# Patient Record
Sex: MALE | Race: WHITE | Employment: FULL TIME | URBAN - METROPOLITAN AREA
[De-identification: names, ages, dates, MRNs, and addresses within clinical notes are randomized per-mention and may not be internally consistent; named-entity substitution may affect disease eponyms.]

---

## 2017-02-24 ENCOUNTER — OFFICE VISIT (OUTPATIENT)
Dept: FAMILY MEDICINE CLINIC | Facility: CLINIC | Age: 32
End: 2017-02-24

## 2017-02-24 ENCOUNTER — HOSPITAL ENCOUNTER (OUTPATIENT)
Dept: GENERAL RADIOLOGY | Facility: HOSPITAL | Age: 32
Discharge: HOME | End: 2017-02-24
Attending: NEUROLOGICAL SURGERY
Payer: OTHER MISCELLANEOUS

## 2017-02-24 VITALS
DIASTOLIC BLOOD PRESSURE: 80 MMHG | SYSTOLIC BLOOD PRESSURE: 125 MMHG | HEART RATE: 78 BPM | BODY MASS INDEX: 34.25 KG/M2 | HEIGHT: 68 IN | WEIGHT: 226 LBS

## 2017-02-24 DIAGNOSIS — Z00.00 ANNUAL PHYSICAL EXAM: Primary | ICD-10-CM

## 2017-02-24 DIAGNOSIS — M54.42 CHRONIC LEFT-SIDED LOW BACK PAIN WITH LEFT-SIDED SCIATICA: ICD-10-CM

## 2017-02-24 DIAGNOSIS — M51.26 LUMBAR HERNIATED DISC: ICD-10-CM

## 2017-02-24 DIAGNOSIS — G89.29 CHRONIC LEFT-SIDED LOW BACK PAIN WITH LEFT-SIDED SCIATICA: ICD-10-CM

## 2017-02-24 PROCEDURE — 99395 PREV VISIT EST AGE 18-39: CPT | Performed by: FAMILY MEDICINE

## 2017-02-24 PROCEDURE — 72100 X-RAY EXAM L-S SPINE 2/3 VWS: CPT

## 2017-02-24 RX ORDER — AMOXICILLIN AND CLAVULANATE POTASSIUM 875; 125 MG/1; MG/1
TABLET, FILM COATED ORAL
Refills: 0 | COMMUNITY
Start: 2017-02-22 | End: 2017-05-03 | Stop reason: ALTCHOICE

## 2017-02-24 RX ORDER — IBUPROFEN 800 MG/1
TABLET ORAL
Refills: 0 | COMMUNITY
Start: 2016-12-29 | End: 2017-05-03 | Stop reason: ALTCHOICE

## 2017-02-24 NOTE — PROGRESS NOTES
Physical    Still with back pain still limping (witnessed)  Work comp. Off work since 2/2016   Radiating to upper posterior left leg. Patient's past medical surgical family social history was reviewed.     Review of Systems  Allergic: no environment b/l lwoer ext. Left great toe weaker than right. Assessment/Plan  1. Annual physical exam  Stable. - Lipid Panel [E]; Future  - Glucose, Serum [E]; Future    2.  Chronic left-sided low back pain with left-sided sciatica  Discussed   F/u with ortho  Con

## 2017-02-25 ENCOUNTER — APPOINTMENT (OUTPATIENT)
Dept: LAB | Age: 32
End: 2017-02-25
Attending: FAMILY MEDICINE
Payer: COMMERCIAL

## 2017-02-25 DIAGNOSIS — Z00.00 ANNUAL PHYSICAL EXAM: ICD-10-CM

## 2017-02-25 LAB
CHOLEST SERPL-MCNC: 135 MG/DL (ref 110–200)
GLUCOSE SERPL-MCNC: 84 MG/DL (ref 70–99)
HDLC SERPL-MCNC: 42 MG/DL
LDLC SERPL CALC-MCNC: 80 MG/DL (ref 0–99)
NONHDLC SERPL-MCNC: 93 MG/DL
TRIGL SERPL-MCNC: 65 MG/DL (ref 1–149)

## 2017-02-25 PROCEDURE — 36415 COLL VENOUS BLD VENIPUNCTURE: CPT

## 2017-02-25 PROCEDURE — 80061 LIPID PANEL: CPT

## 2017-02-25 PROCEDURE — 82947 ASSAY GLUCOSE BLOOD QUANT: CPT

## 2017-05-03 ENCOUNTER — OFFICE VISIT (OUTPATIENT)
Dept: FAMILY MEDICINE CLINIC | Facility: CLINIC | Age: 32
End: 2017-05-03

## 2017-05-03 VITALS
SYSTOLIC BLOOD PRESSURE: 125 MMHG | WEIGHT: 223.38 LBS | HEART RATE: 61 BPM | DIASTOLIC BLOOD PRESSURE: 85 MMHG | BODY MASS INDEX: 34 KG/M2

## 2017-05-03 DIAGNOSIS — B00.1 HERPES LABIALIS: Primary | ICD-10-CM

## 2017-05-03 PROCEDURE — 99212 OFFICE O/P EST SF 10 MIN: CPT | Performed by: FAMILY MEDICINE

## 2017-05-03 PROCEDURE — 99213 OFFICE O/P EST LOW 20 MIN: CPT | Performed by: FAMILY MEDICINE

## 2017-05-03 RX ORDER — VALACYCLOVIR HYDROCHLORIDE 1 G/1
TABLET, FILM COATED ORAL
Qty: 4 TABLET | Refills: 6 | Status: SHIPPED | OUTPATIENT
Start: 2017-05-03 | End: 2017-08-31

## 2017-06-14 ENCOUNTER — OFFICE VISIT (OUTPATIENT)
Dept: FAMILY MEDICINE CLINIC | Facility: CLINIC | Age: 32
End: 2017-06-14

## 2017-06-14 VITALS
DIASTOLIC BLOOD PRESSURE: 74 MMHG | BODY MASS INDEX: 34 KG/M2 | WEIGHT: 223 LBS | HEART RATE: 72 BPM | TEMPERATURE: 98 F | SYSTOLIC BLOOD PRESSURE: 121 MMHG

## 2017-06-14 DIAGNOSIS — J06.9 UPPER RESPIRATORY TRACT INFECTION, UNSPECIFIED TYPE: Primary | ICD-10-CM

## 2017-06-14 PROCEDURE — 99213 OFFICE O/P EST LOW 20 MIN: CPT | Performed by: FAMILY MEDICINE

## 2017-06-14 RX ORDER — AZELASTINE 1 MG/ML
2 SPRAY, METERED NASAL 2 TIMES DAILY
Qty: 1 BOTTLE | Refills: 0 | Status: SHIPPED | OUTPATIENT
Start: 2017-06-14 | End: 2017-08-31

## 2017-06-14 RX ORDER — PREDNISONE 20 MG/1
TABLET ORAL
Qty: 10 TABLET | Refills: 0 | Status: SHIPPED | OUTPATIENT
Start: 2017-06-14 | End: 2017-08-31

## 2017-08-31 ENCOUNTER — TELEPHONE (OUTPATIENT)
Dept: FAMILY MEDICINE CLINIC | Facility: CLINIC | Age: 32
End: 2017-08-31

## 2017-08-31 ENCOUNTER — OFFICE VISIT (OUTPATIENT)
Dept: FAMILY MEDICINE CLINIC | Facility: CLINIC | Age: 32
End: 2017-08-31

## 2017-08-31 VITALS
SYSTOLIC BLOOD PRESSURE: 131 MMHG | DIASTOLIC BLOOD PRESSURE: 87 MMHG | WEIGHT: 214 LBS | HEART RATE: 76 BPM | BODY MASS INDEX: 33 KG/M2

## 2017-08-31 DIAGNOSIS — G89.29 CHRONIC LOW BACK PAIN WITH LEFT-SIDED SCIATICA, UNSPECIFIED BACK PAIN LATERALITY: ICD-10-CM

## 2017-08-31 DIAGNOSIS — M54.42 CHRONIC LOW BACK PAIN WITH LEFT-SIDED SCIATICA, UNSPECIFIED BACK PAIN LATERALITY: ICD-10-CM

## 2017-08-31 DIAGNOSIS — G89.29 CHRONIC LOW BACK PAIN WITH LEFT-SIDED SCIATICA, UNSPECIFIED BACK PAIN LATERALITY: Primary | ICD-10-CM

## 2017-08-31 DIAGNOSIS — M54.42 CHRONIC LOW BACK PAIN WITH LEFT-SIDED SCIATICA, UNSPECIFIED BACK PAIN LATERALITY: Primary | ICD-10-CM

## 2017-08-31 PROCEDURE — 99212 OFFICE O/P EST SF 10 MIN: CPT | Performed by: FAMILY MEDICINE

## 2017-08-31 PROCEDURE — 99214 OFFICE O/P EST MOD 30 MIN: CPT | Performed by: FAMILY MEDICINE

## 2017-08-31 RX ORDER — DICLOFENAC SODIUM AND MISOPROSTOL 75; 200 MG/1; UG/1
1 TABLET, DELAYED RELEASE ORAL 2 TIMES DAILY
Qty: 60 TABLET | Refills: 0 | Status: SHIPPED | OUTPATIENT
Start: 2017-08-31 | End: 2017-10-02

## 2017-08-31 RX ORDER — DICLOFENAC SODIUM AND MISOPROSTOL 75; 200 MG/1; UG/1
1 TABLET, DELAYED RELEASE ORAL 2 TIMES DAILY
Qty: 60 TABLET | Refills: 0 | Status: SHIPPED | OUTPATIENT
Start: 2017-08-31 | End: 2017-08-31

## 2017-08-31 RX ORDER — IBUPROFEN 800 MG/1
800 TABLET ORAL EVERY 6 HOURS PRN
COMMUNITY
End: 2017-11-16 | Stop reason: ALTCHOICE

## 2017-08-31 RX ORDER — IBUPROFEN 200 MG
200 TABLET ORAL EVERY 6 HOURS PRN
COMMUNITY
End: 2017-10-02

## 2017-08-31 RX ORDER — ACETAMINOPHEN 500 MG
500 TABLET ORAL EVERY 6 HOURS PRN
COMMUNITY
End: 2017-10-02

## 2017-08-31 NOTE — TELEPHONE ENCOUNTER
No good substitute. Already generic. rn  Send to [address] [place] and see how much they charge. I would pay for the month and see if it works.

## 2017-08-31 NOTE — PROGRESS NOTES
Chronic back pain  advil 4-6 tabs a day (200mg)  Tylenol  4-6 tabs a day (500mg)  Otherwise my back kills me  The morning times are rough so I start taking it and then in afternoon/evening I'm dying with the back pain.   Had fusion sept 2016   Not working y

## 2017-08-31 NOTE — TELEPHONE ENCOUNTER
Pt notified, says he will give it a try.   [facility] where his wife has prescription benefits and he will compare cost.  Rx sent per pt request.

## 2017-08-31 NOTE — TELEPHONE ENCOUNTER
[facility] [address]  [place], [place]       THIS MEDICATION IS OVER $145.00      ASKING FOR A DIFFERENT MEDICATION       Current Outpatient Prescriptions:  Diclofenac-Misoprostol 75-0.2 MG Oral Tab EC Take 1 tablet by mouth 2 (two) times daily.  Disp: 60 tablet Rfl:

## 2017-09-01 ENCOUNTER — TELEPHONE (OUTPATIENT)
Dept: FAMILY MEDICINE CLINIC | Facility: CLINIC | Age: 32
End: 2017-09-01

## 2017-09-01 NOTE — TELEPHONE ENCOUNTER
Pt states he has not started taking the medication Diclofenac-misoprostol. Pt wants to know if he should still keep his appointment for the 9/21/2017. Or wait until he starts the medication.

## 2017-09-01 NOTE — TELEPHONE ENCOUNTER
Patient called back. He will cancel appt. He states he is going to wait until he gets his MRI ordered by his neurologist. He is scheduled for Thursday 9/7/17.  Patient also states his  advised him to hold off on paying for medication until his MRI

## 2017-09-01 NOTE — TELEPHONE ENCOUNTER
Patient was left a message to call back. I left message to f/u about 2-3 weeks after taking medication. Transfer to [id number]    See previous encounter; medication was expensive. Did he ever get his medication afterall at other pharmacy [facility]?). .....    LOV

## 2017-09-02 ENCOUNTER — APPOINTMENT (OUTPATIENT)
Dept: LAB | Age: 32
End: 2017-09-02
Attending: FAMILY MEDICINE
Payer: COMMERCIAL

## 2017-09-02 DIAGNOSIS — M54.42 CHRONIC LOW BACK PAIN WITH LEFT-SIDED SCIATICA, UNSPECIFIED BACK PAIN LATERALITY: ICD-10-CM

## 2017-09-02 DIAGNOSIS — G89.29 CHRONIC LOW BACK PAIN WITH LEFT-SIDED SCIATICA, UNSPECIFIED BACK PAIN LATERALITY: ICD-10-CM

## 2017-09-02 LAB
ALBUMIN SERPL BCP-MCNC: 4.2 G/DL (ref 3.5–4.8)
ALBUMIN/GLOB SERPL: 2.3 {RATIO} (ref 1–2)
ALP SERPL-CCNC: 37 U/L (ref 32–100)
ALT SERPL-CCNC: 20 U/L (ref 17–63)
ANION GAP SERPL CALC-SCNC: 7 MMOL/L (ref 0–18)
AST SERPL-CCNC: 20 U/L (ref 15–41)
BILIRUB SERPL-MCNC: 1 MG/DL (ref 0.3–1.2)
BUN SERPL-MCNC: 9 MG/DL (ref 8–20)
BUN/CREAT SERPL: 8.5 (ref 10–20)
CALCIUM SERPL-MCNC: 8.6 MG/DL (ref 8.5–10.5)
CHLORIDE SERPL-SCNC: 106 MMOL/L (ref 95–110)
CO2 SERPL-SCNC: 28 MMOL/L (ref 22–32)
CREAT SERPL-MCNC: 1.06 MG/DL (ref 0.5–1.5)
GLOBULIN PLAS-MCNC: 1.8 G/DL (ref 2.5–3.7)
GLUCOSE SERPL-MCNC: 76 MG/DL (ref 70–99)
OSMOLALITY UR CALC.SUM OF ELEC: 289 MOSM/KG (ref 275–295)
POTASSIUM SERPL-SCNC: 4.3 MMOL/L (ref 3.3–5.1)
PROT SERPL-MCNC: 6 G/DL (ref 5.9–8.4)
SODIUM SERPL-SCNC: 141 MMOL/L (ref 136–144)

## 2017-09-02 PROCEDURE — 80053 COMPREHEN METABOLIC PANEL: CPT

## 2017-09-02 PROCEDURE — 36415 COLL VENOUS BLD VENIPUNCTURE: CPT

## 2017-09-03 ENCOUNTER — PATIENT MESSAGE (OUTPATIENT)
Dept: FAMILY MEDICINE CLINIC | Facility: CLINIC | Age: 32
End: 2017-09-03

## 2017-09-03 DIAGNOSIS — R77.1 ABNORMALITY OF GLOBULIN: Primary | ICD-10-CM

## 2017-09-05 ENCOUNTER — PATIENT MESSAGE (OUTPATIENT)
Dept: FAMILY MEDICINE CLINIC | Facility: CLINIC | Age: 32
End: 2017-09-05

## 2017-09-07 ENCOUNTER — HOSPITAL ENCOUNTER (OUTPATIENT)
Dept: MRI IMAGING | Age: 32
Discharge: HOME | End: 2017-09-07
Attending: NEUROLOGICAL SURGERY
Payer: OTHER MISCELLANEOUS

## 2017-09-07 DIAGNOSIS — M51.37 DISC DEGENERATION, LUMBOSACRAL: ICD-10-CM

## 2017-09-07 DIAGNOSIS — M51.16 HERNIATION OF LUMBAR INTERVERTEBRAL DISC WITH RADICULOPATHY: ICD-10-CM

## 2017-09-07 PROCEDURE — A9575 INJ GADOTERATE MEGLUMI 0.1ML: HCPCS | Performed by: NEUROLOGICAL SURGERY

## 2017-09-07 PROCEDURE — 72158 MRI LUMBAR SPINE W/O & W/DYE: CPT | Performed by: NEUROLOGICAL SURGERY

## 2017-09-08 NOTE — TELEPHONE ENCOUNTER
[facility] msg sent to patient.   If he has further questions, transfer to [phone]

## 2017-09-08 NOTE — TELEPHONE ENCOUNTER
From: Marco Gupta  To: Dahlia Tai DO  Sent: 9/5/2017 8:19 AM CDT  Subject: Test Results Question    Not sure if you got my last message, but I seen the test results LOL and I have no idea what they mean.  So if you could just let me know that

## 2017-09-08 NOTE — TELEPHONE ENCOUNTER
From: Akiko Watson  To: Sheila Fernandez DO  Sent: 9/3/2017 5:46 PM CDT  Subject: Test Results Question    I have a question about COMP METABOLIC PANEL (14) resulted on 9/2/17, 11:42 AM.    So since I have no idea what all this means lol.  Is everyt

## 2017-09-08 NOTE — TELEPHONE ENCOUNTER
Advised patient of Dr. Susanna Snider s note and went over any questions patient had. Patient verbalized understanding. Lab order generated. Appointment made for 10/2/17 at 9:20am with Dr Marely Winter in [place].  Patient will get non-fasting blood work done a

## 2017-09-12 ENCOUNTER — TELEPHONE (OUTPATIENT)
Dept: OTHER | Age: 32
End: 2017-09-12

## 2017-09-15 ENCOUNTER — PATIENT MESSAGE (OUTPATIENT)
Dept: FAMILY MEDICINE CLINIC | Facility: CLINIC | Age: 32
End: 2017-09-15

## 2017-09-16 ENCOUNTER — PATIENT MESSAGE (OUTPATIENT)
Dept: FAMILY MEDICINE CLINIC | Facility: CLINIC | Age: 32
End: 2017-09-16

## 2017-09-16 NOTE — TELEPHONE ENCOUNTER
From: Pablo Mtz  To: Dewayne Crockett DO  Sent: 9/15/2017 3:33 PM CDT  Subject: Test Results Question    I was just wondering what The results meant?  In my terms LOL didn't understand some of the big words PASCUAL thank you

## 2017-09-18 ENCOUNTER — LAB ENCOUNTER (OUTPATIENT)
Dept: LAB | Age: 32
End: 2017-09-18
Attending: FAMILY MEDICINE
Payer: COMMERCIAL

## 2017-09-18 DIAGNOSIS — R77.1 ABNORMALITY OF GLOBULIN: ICD-10-CM

## 2017-09-18 LAB
ALBUMIN SERPL BCP-MCNC: 4.2 G/DL (ref 3.5–4.8)
ALBUMIN/GLOB SERPL: 2.1 {RATIO} (ref 1–2)
ALP SERPL-CCNC: 43 U/L (ref 32–100)
ALT SERPL-CCNC: 17 U/L (ref 17–63)
ANION GAP SERPL CALC-SCNC: 10 MMOL/L (ref 0–18)
AST SERPL-CCNC: 21 U/L (ref 15–41)
BASOPHILS # BLD: 0.1 K/UL (ref 0–0.2)
BASOPHILS NFR BLD: 1 %
BILIRUB SERPL-MCNC: 0.8 MG/DL (ref 0.3–1.2)
BUN SERPL-MCNC: 12 MG/DL (ref 8–20)
BUN/CREAT SERPL: 12.9 (ref 10–20)
CALCIUM SERPL-MCNC: 8.5 MG/DL (ref 8.5–10.5)
CHLORIDE SERPL-SCNC: 103 MMOL/L (ref 95–110)
CO2 SERPL-SCNC: 24 MMOL/L (ref 22–32)
CREAT SERPL-MCNC: 0.93 MG/DL (ref 0.5–1.5)
EOSINOPHIL # BLD: 0.5 K/UL (ref 0–0.7)
EOSINOPHIL NFR BLD: 6 %
ERYTHROCYTE [DISTWIDTH] IN BLOOD BY AUTOMATED COUNT: 12.6 % (ref 11–15)
GLOBULIN PLAS-MCNC: 2 G/DL (ref 2.5–3.7)
GLUCOSE SERPL-MCNC: 76 MG/DL (ref 70–99)
HCT VFR BLD AUTO: 43.1 % (ref 41–52)
HGB BLD-MCNC: 14.7 G/DL (ref 13.5–17.5)
LYMPHOCYTES # BLD: 2.6 K/UL (ref 1–4)
LYMPHOCYTES NFR BLD: 31 %
MCH RBC QN AUTO: 32.1 PG (ref 27–32)
MCHC RBC AUTO-ENTMCNC: 34.2 G/DL (ref 32–37)
MCV RBC AUTO: 94 FL (ref 80–100)
MONOCYTES # BLD: 0.4 K/UL (ref 0–1)
MONOCYTES NFR BLD: 4 %
NEUTROPHILS # BLD AUTO: 4.9 K/UL (ref 1.8–7.7)
NEUTROPHILS NFR BLD: 58 %
OSMOLALITY UR CALC.SUM OF ELEC: 283 MOSM/KG (ref 275–295)
PLATELET # BLD AUTO: 281 K/UL (ref 140–400)
PMV BLD AUTO: 8.7 FL (ref 7.4–10.3)
POTASSIUM SERPL-SCNC: 3.7 MMOL/L (ref 3.3–5.1)
PROT SERPL-MCNC: 6.2 G/DL (ref 5.9–8.4)
RBC # BLD AUTO: 4.59 M/UL (ref 4.5–5.9)
SODIUM SERPL-SCNC: 137 MMOL/L (ref 136–144)
WBC # BLD AUTO: 8.4 K/UL (ref 4–11)

## 2017-09-18 PROCEDURE — 36415 COLL VENOUS BLD VENIPUNCTURE: CPT

## 2017-09-18 PROCEDURE — 80053 COMPREHEN METABOLIC PANEL: CPT

## 2017-09-18 PROCEDURE — 85025 COMPLETE CBC W/AUTO DIFF WBC: CPT

## 2017-09-18 NOTE — TELEPHONE ENCOUNTER
From: Genny Sierra  To: Beny Ledbetter DO  Sent: 9/16/2017 11:36 AM CDT  Subject: Test Results Question    The MRI results

## 2017-09-19 ENCOUNTER — TELEPHONE (OUTPATIENT)
Dept: OTHER | Age: 32
End: 2017-09-19

## 2017-09-19 DIAGNOSIS — E88.09 PROTEINS SERUM PLASMA LOW: Primary | ICD-10-CM

## 2017-09-19 NOTE — TELEPHONE ENCOUNTER
[facility] pt was inform of your message below. 1. I called lab and they can not run a Serum Protein Electrophoresis. Pt will have to come in for another blood drawn. Do you want him to have it done? If yes a need a dx low globulin is not a option.

## 2017-09-19 NOTE — TELEPHONE ENCOUNTER
I have a question about COMP METABOLIC PANEL (14) resulted on 9/18/17, 5:48 PM.    So is everything ok?  With my blood test results done 9-18-17  Thanks   Carlito Rodrigues

## 2017-09-19 NOTE — TELEPHONE ENCOUNTER
Notes Recorded by Trinh Peñaloza DO on 9/19/2017 at 9:21 AM CDT  Protein level was low   rn see if they can run a serum protein electrophoresis  Dx low globulin (a protein you body makes)  Other than that the cmp and cbc were normal.

## 2017-09-21 ENCOUNTER — APPOINTMENT (OUTPATIENT)
Dept: LAB | Age: 32
End: 2017-09-21
Attending: FAMILY MEDICINE
Payer: COMMERCIAL

## 2017-09-21 DIAGNOSIS — E88.09 PROTEINS SERUM PLASMA LOW: ICD-10-CM

## 2017-09-21 PROCEDURE — 36415 COLL VENOUS BLD VENIPUNCTURE: CPT

## 2017-09-21 PROCEDURE — 84165 PROTEIN E-PHORESIS SERUM: CPT

## 2017-09-26 ENCOUNTER — PATIENT MESSAGE (OUTPATIENT)
Dept: FAMILY MEDICINE CLINIC | Facility: CLINIC | Age: 32
End: 2017-09-26

## 2017-09-26 LAB
ALBUMIN SERPL ELPH-MCNC: 4.49 G/DL (ref 3.75–5.21)
ALBUMIN/GLOB SERPL: 2.13 {RATIO} (ref 1–2)
ALPHA1 GLOB SERPL ELPH-MCNC: 0.28 G/DL (ref 0.19–0.46)
ALPHA2 GLOB SERPL ELPH-MCNC: 0.69 G/DL (ref 0.48–1.05)
B-GLOBULIN SERPL ELPH-MCNC: 0.65 G/DL (ref 0.68–1.23)
GAMMA GLOB SERPL ELPH-MCNC: 0.48 G/DL (ref 0.62–1.7)
TOTAL PROTEIN (SPECIAL TESTING): 6.6 G/DL (ref 6.5–9.1)

## 2017-09-27 ENCOUNTER — TELEPHONE (OUTPATIENT)
Dept: OTHER | Age: 32
End: 2017-09-27

## 2017-09-27 NOTE — TELEPHONE ENCOUNTER
Pt advised of results/message below, he verbalized understanding. He states he has completely eliminated tylenol since last month and currently he has been taking ibuprofen 200 mg, usually twice daily, but some times takes as many as 4 day.   Pt lives with

## 2017-09-28 ENCOUNTER — TELEPHONE (OUTPATIENT)
Dept: FAMILY MEDICINE CLINIC | Facility: CLINIC | Age: 32
End: 2017-09-28

## 2017-09-28 DIAGNOSIS — M54.5 CHRONIC LOW BACK PAIN, UNSPECIFIED BACK PAIN LATERALITY, WITH SCIATICA PRESENCE UNSPECIFIED: Primary | ICD-10-CM

## 2017-09-28 DIAGNOSIS — G89.29 CHRONIC LOW BACK PAIN, UNSPECIFIED BACK PAIN LATERALITY, WITH SCIATICA PRESENCE UNSPECIFIED: Primary | ICD-10-CM

## 2017-09-28 NOTE — TELEPHONE ENCOUNTER
Spoke to the patient and states that he is under [employer] right now, he needs to talk to his  first before going to the pain clinic and follow the referral. Patient states that he does'nt have much choice but will take the ibuprofen 2-4 x/day on

## 2017-10-02 ENCOUNTER — OFFICE VISIT (OUTPATIENT)
Dept: FAMILY MEDICINE CLINIC | Facility: CLINIC | Age: 32
End: 2017-10-02

## 2017-10-02 VITALS
HEART RATE: 60 BPM | WEIGHT: 222 LBS | TEMPERATURE: 98 F | HEIGHT: 68 IN | RESPIRATION RATE: 16 BRPM | BODY MASS INDEX: 33.65 KG/M2 | DIASTOLIC BLOOD PRESSURE: 66 MMHG | SYSTOLIC BLOOD PRESSURE: 107 MMHG

## 2017-10-02 DIAGNOSIS — G89.29 CHRONIC LEFT-SIDED LOW BACK PAIN WITH LEFT-SIDED SCIATICA: Primary | ICD-10-CM

## 2017-10-02 DIAGNOSIS — M54.42 CHRONIC LEFT-SIDED LOW BACK PAIN WITH LEFT-SIDED SCIATICA: Primary | ICD-10-CM

## 2017-10-02 PROCEDURE — 99213 OFFICE O/P EST LOW 20 MIN: CPT | Performed by: FAMILY MEDICINE

## 2017-10-02 PROCEDURE — 99212 OFFICE O/P EST SF 10 MIN: CPT | Performed by: FAMILY MEDICINE

## 2017-10-02 RX ORDER — VALACYCLOVIR HYDROCHLORIDE 1 G/1
TABLET, FILM COATED ORAL
Refills: 6 | COMMUNITY
Start: 2017-09-18 | End: 2017-11-16 | Stop reason: ALTCHOICE

## 2017-10-02 NOTE — PROGRESS NOTES
advil tylenol  2-3 a day  Its not a lot anymore. \"I tried to go a whole day without back pain medication. I need to take something. \"  Has been limiting his tylenol use. left lower back pain with throbbing down the left leg.     Examination of the back

## 2017-10-12 ENCOUNTER — OFFICE VISIT (OUTPATIENT)
Dept: FAMILY MEDICINE CLINIC | Facility: CLINIC | Age: 32
End: 2017-10-12

## 2017-10-12 VITALS
WEIGHT: 218.5 LBS | HEART RATE: 78 BPM | BODY MASS INDEX: 33.12 KG/M2 | HEIGHT: 68 IN | DIASTOLIC BLOOD PRESSURE: 85 MMHG | SYSTOLIC BLOOD PRESSURE: 134 MMHG

## 2017-10-12 DIAGNOSIS — M54.42 CHRONIC LEFT-SIDED LOW BACK PAIN WITH LEFT-SIDED SCIATICA: Primary | ICD-10-CM

## 2017-10-12 DIAGNOSIS — G89.29 CHRONIC LEFT-SIDED LOW BACK PAIN WITH LEFT-SIDED SCIATICA: Primary | ICD-10-CM

## 2017-10-12 DIAGNOSIS — T39.395A NSAID INDUCED GASTRITIS: ICD-10-CM

## 2017-10-12 DIAGNOSIS — K29.60 NSAID INDUCED GASTRITIS: ICD-10-CM

## 2017-10-12 PROCEDURE — 99214 OFFICE O/P EST MOD 30 MIN: CPT | Performed by: FAMILY MEDICINE

## 2017-10-12 PROCEDURE — 99212 OFFICE O/P EST SF 10 MIN: CPT | Performed by: FAMILY MEDICINE

## 2017-10-12 PROCEDURE — 90686 IIV4 VACC NO PRSV 0.5 ML IM: CPT | Performed by: FAMILY MEDICINE

## 2017-10-12 PROCEDURE — 90471 IMMUNIZATION ADMIN: CPT | Performed by: FAMILY MEDICINE

## 2017-10-12 RX ORDER — TRAMADOL HYDROCHLORIDE 50 MG/1
50 TABLET ORAL EVERY 8 HOURS PRN
Qty: 60 TABLET | Refills: 0 | Status: SHIPPED | OUTPATIENT
Start: 2017-10-12 | End: 2018-07-09 | Stop reason: ALTCHOICE

## 2017-10-12 RX ORDER — OMEPRAZOLE 40 MG/1
40 CAPSULE, DELAYED RELEASE ORAL DAILY
Qty: 30 CAPSULE | Refills: 1 | Status: SHIPPED | OUTPATIENT
Start: 2017-10-12 | End: 2017-11-09

## 2017-10-12 RX ORDER — CINNAMON BARK
POWDER (GRAM) MISCELLANEOUS
COMMUNITY
End: 2017-11-16 | Stop reason: ALTCHOICE

## 2017-10-12 NOTE — PROGRESS NOTES
Ibuprofen is down to 2-3 a day  \"I was in pretty bad. \"  Hasn't gone to pain clinc yet. Reviewed mri with patient     Doing some home exercises. Having stomach bloating  Some days it wasn't bad. Eating less.   \"I ate 1/2 apple and it feels like I a

## 2017-11-09 ENCOUNTER — OFFICE VISIT (OUTPATIENT)
Dept: FAMILY MEDICINE CLINIC | Facility: CLINIC | Age: 32
End: 2017-11-09

## 2017-11-09 VITALS
BODY MASS INDEX: 32 KG/M2 | SYSTOLIC BLOOD PRESSURE: 121 MMHG | HEART RATE: 69 BPM | DIASTOLIC BLOOD PRESSURE: 81 MMHG | WEIGHT: 210 LBS

## 2017-11-09 DIAGNOSIS — Z00.00 ANNUAL PHYSICAL EXAM: Primary | ICD-10-CM

## 2017-11-09 DIAGNOSIS — K21.9 GASTROESOPHAGEAL REFLUX DISEASE, ESOPHAGITIS PRESENCE NOT SPECIFIED: ICD-10-CM

## 2017-11-09 DIAGNOSIS — G89.29 CHRONIC LEFT-SIDED LOW BACK PAIN WITH LEFT-SIDED SCIATICA: ICD-10-CM

## 2017-11-09 DIAGNOSIS — M54.42 CHRONIC LEFT-SIDED LOW BACK PAIN WITH LEFT-SIDED SCIATICA: ICD-10-CM

## 2017-11-09 DIAGNOSIS — E66.09 CLASS 1 OBESITY DUE TO EXCESS CALORIES WITHOUT SERIOUS COMORBIDITY WITH BODY MASS INDEX (BMI) OF 32.0 TO 32.9 IN ADULT: ICD-10-CM

## 2017-11-09 PROCEDURE — 99395 PREV VISIT EST AGE 18-39: CPT | Performed by: FAMILY MEDICINE

## 2017-11-09 RX ORDER — OMEPRAZOLE 40 MG/1
40 CAPSULE, DELAYED RELEASE ORAL DAILY
Qty: 90 CAPSULE | Refills: 1 | Status: SHIPPED | OUTPATIENT
Start: 2017-11-09 | End: 2018-11-04

## 2017-11-09 NOTE — PROGRESS NOTES
Physical    Needs to take ppi daily  \"When I don't take it I can feel my stomach.'    \"My back bothers me still but I'm doing a lot more. \"    Has lost 13 lbs in last few months  \"I'm trying too.   \"    Patient's past medical surgical family social hist normal.    Assessment/Plan    1. Annual physical exam  Stable. 2. Gastroesophageal reflux disease, esophagitis presence not specified  Continue ppi    3. Chronic left-sided low back pain with left-sided sciatica  Still working through it. Tramadol prn.

## 2017-11-12 ENCOUNTER — PATIENT MESSAGE (OUTPATIENT)
Dept: FAMILY MEDICINE CLINIC | Facility: CLINIC | Age: 32
End: 2017-11-12

## 2017-11-13 ENCOUNTER — NURSE TRIAGE (OUTPATIENT)
Dept: OTHER | Age: 32
End: 2017-11-13

## 2017-11-13 DIAGNOSIS — R10.9 ABDOMINAL PAIN, UNSPECIFIED ABDOMINAL LOCATION: Primary | ICD-10-CM

## 2017-11-13 RX ORDER — DICYCLOMINE HCL 20 MG
TABLET ORAL
Qty: 30 TABLET | Refills: 0 | Status: SHIPPED | OUTPATIENT
Start: 2017-11-13 | End: 2018-07-09 | Stop reason: ALTCHOICE

## 2017-11-13 NOTE — TELEPHONE ENCOUNTER
Continue with omeprazole  Add bentyl 20mg 1 po tid #30 no refill  Refer to gi dx abd pain  To er if fever or vomiting.

## 2017-11-13 NOTE — TELEPHONE ENCOUNTER
From: Meagan Ferrer  To: Maryann Kellogg DO  Sent: 11/12/2017 7:19 AM CST  Subject: Other    Hello this is Peconic Stains and I know I was in to see you on Thursday.  But on Friday night my stomach was in a lot of pain kind a like tremendously bad

## 2017-11-13 NOTE — TELEPHONE ENCOUNTER
Advised patient on Dr. Shannan Montalvo prescriptions, GI referral, and recommendations. Script for bentyl generic sent to pharmacy. Patient advised to consult [facility] website for GI specialist in the network; patient agreed and will schedule.  Advised patient if he

## 2017-11-13 NOTE — TELEPHONE ENCOUNTER
Action Requested: Summary for Provider     []  Critical Lab, Recommendations Needed  [x] Need Additional Advice  []   FYI    [x]   Need Orders  [] Need Medications Sent to Pharmacy  []  Other     SUMMARY:Requesting MD recommendations or tests to be done, d ABDOMINAL PAIN - MALE-A-OH

## 2017-11-14 ENCOUNTER — TELEPHONE (OUTPATIENT)
Dept: OTHER | Age: 32
End: 2017-11-14

## 2017-11-14 ENCOUNTER — TELEPHONE (OUTPATIENT)
Dept: GASTROENTEROLOGY | Facility: CLINIC | Age: 32
End: 2017-11-14

## 2017-11-14 ENCOUNTER — PATIENT MESSAGE (OUTPATIENT)
Dept: FAMILY MEDICINE CLINIC | Facility: CLINIC | Age: 32
End: 2017-11-14

## 2017-11-14 DIAGNOSIS — R14.0 ABDOMINAL BLOATING: Primary | ICD-10-CM

## 2017-11-14 NOTE — TELEPHONE ENCOUNTER
Yesenia from Dr Crane Narciso office called (ext [phone]). Pt has been having a lot of bloating, distention, diarrhea for a couple of months, alternates with constipation. Takes omeprazole and dicyclomine, prune juice, occasional laxative.  Complains also of

## 2017-11-14 NOTE — TELEPHONE ENCOUNTER
Patient stated he has been taking all the medication prescribed and still has continuous abdominal pains everyday after eating. He stated he cannot wait until his appointment to see Dr. Itzel Mariano on 11/30/17.    Every time he eats he develops an uncomforta

## 2017-11-15 NOTE — TELEPHONE ENCOUNTER
Patient notified of MD's recommendation. Patient verbalized understanding. Patient will call to schedule testing.

## 2017-11-15 NOTE — PROGRESS NOTES
[address] [facility] [address] Gastroenterology                                                                                                  Clinic History and Physical     Pa evidence of cholelithiasis or cholecystitis. No evidence of hepatobiliary dilatation. Pancreas unremarkable though views of the tail are obscured by bowel gas.     Most recent lab work including a [address] and CMP were completed in September 2017 and were most Diabetes Paternal Aunt    • Crohn's Disease Paternal Aunt    • Diabetes Maternal Aunt       Social History: Smoking status: Former Smoker                                                              Packs/day: 1.50      Years: 0.00         Quit date: 5/1/2 warm and well perfused   Lung: effort normal and breath sounds normal, no respiratory distress, wheezes or rales  GI: soft, non-tender exam in all quadrants without rigidity or guarding, non-distended, no abnormal bowel sounds noted, no masses are palpated patient is advised to follow up with the office with new onset or worsening symptoms. 2.  GERD: Well managed on omeprazole 40 mg daily without overt acid reflux symptoms breaking through.   Patient however does continue to have the above listed symptoms Visit:    Orders Placed This Encounter      Sed Jimmy Rendon (Automated)      Immunoglobulin A, Qn, Serum (IGA)      Tissue Transglutaminase Ab, IgA      C-Reactive Protein      Calprotectin, Fecal    Meds This Visit:    Signed Prescriptions Disp Refil

## 2017-11-15 NOTE — TELEPHONE ENCOUNTER
Patient indicated that called scheduling and could not get an appointment till 12/2017. Patient only ate about 5 crackers and prune juice yesterday. Today had his medications with 5 crackers and fruit juice.   Scheduling indicated that patient has to be NPO

## 2017-11-16 ENCOUNTER — HOSPITAL ENCOUNTER (OUTPATIENT)
Dept: ULTRASOUND IMAGING | Facility: HOSPITAL | Age: 32
Discharge: HOME | End: 2017-11-16
Attending: FAMILY MEDICINE
Payer: COMMERCIAL

## 2017-11-16 ENCOUNTER — APPOINTMENT (OUTPATIENT)
Dept: LAB | Facility: HOSPITAL | Age: 32
End: 2017-11-16
Attending: FAMILY MEDICINE
Payer: COMMERCIAL

## 2017-11-16 ENCOUNTER — TELEPHONE (OUTPATIENT)
Dept: GASTROENTEROLOGY | Facility: CLINIC | Age: 32
End: 2017-11-16

## 2017-11-16 ENCOUNTER — OFFICE VISIT (OUTPATIENT)
Dept: GASTROENTEROLOGY | Facility: CLINIC | Age: 32
End: 2017-11-16

## 2017-11-16 VITALS
BODY MASS INDEX: 31.01 KG/M2 | WEIGHT: 204.63 LBS | DIASTOLIC BLOOD PRESSURE: 86 MMHG | SYSTOLIC BLOOD PRESSURE: 128 MMHG | HEIGHT: 68 IN | HEART RATE: 65 BPM

## 2017-11-16 DIAGNOSIS — Z83.79 FHX: CROHN'S DISEASE: Primary | ICD-10-CM

## 2017-11-16 DIAGNOSIS — R68.81 EARLY SATIETY: ICD-10-CM

## 2017-11-16 DIAGNOSIS — R63.0 LOSS OF APPETITE: ICD-10-CM

## 2017-11-16 DIAGNOSIS — R14.0 ABDOMINAL BLOATING: ICD-10-CM

## 2017-11-16 DIAGNOSIS — Z83.79 FAMILY HISTORY OF CROHN'S DISEASE: ICD-10-CM

## 2017-11-16 DIAGNOSIS — R11.11 VOMITING WITHOUT NAUSEA, INTRACTABILITY OF VOMITING NOT SPECIFIED, UNSPECIFIED VOMITING TYPE: ICD-10-CM

## 2017-11-16 DIAGNOSIS — R19.4 ALTERED BOWEL HABITS: ICD-10-CM

## 2017-11-16 DIAGNOSIS — R10.9 ABDOMINAL CRAMPING: ICD-10-CM

## 2017-11-16 DIAGNOSIS — R63.4 WEIGHT LOSS, UNINTENTIONAL: ICD-10-CM

## 2017-11-16 DIAGNOSIS — R63.4 WEIGHT LOSS: ICD-10-CM

## 2017-11-16 DIAGNOSIS — R10.9 ABDOMINAL CRAMPING: Primary | ICD-10-CM

## 2017-11-16 DIAGNOSIS — R10.9 ABDOMINAL PAIN, UNSPECIFIED ABDOMINAL LOCATION: ICD-10-CM

## 2017-11-16 DIAGNOSIS — R19.4 CHANGE IN BOWEL HABITS: ICD-10-CM

## 2017-11-16 DIAGNOSIS — D80.2 IGA DEFICIENCY (HCC): ICD-10-CM

## 2017-11-16 PROCEDURE — 83516 IMMUNOASSAY NONANTIBODY: CPT

## 2017-11-16 PROCEDURE — 99244 OFF/OP CNSLTJ NEW/EST MOD 40: CPT | Performed by: NURSE PRACTITIONER

## 2017-11-16 PROCEDURE — 86140 C-REACTIVE PROTEIN: CPT

## 2017-11-16 PROCEDURE — 36415 COLL VENOUS BLD VENIPUNCTURE: CPT

## 2017-11-16 PROCEDURE — 76705 ECHO EXAM OF ABDOMEN: CPT | Performed by: FAMILY MEDICINE

## 2017-11-16 PROCEDURE — 85652 RBC SED RATE AUTOMATED: CPT

## 2017-11-16 PROCEDURE — 82784 ASSAY IGA/IGD/IGG/IGM EACH: CPT | Performed by: NURSE PRACTITIONER

## 2017-11-16 PROCEDURE — 99212 OFFICE O/P EST SF 10 MIN: CPT | Performed by: NURSE PRACTITIONER

## 2017-11-16 NOTE — H&P
[address] [facility] [address] Gastroenterology                                                                                                  Clinic History and Physical     Pa evidence of cholelithiasis or cholecystitis. No evidence of hepatobiliary dilatation. Pancreas unremarkable though views of the tail are obscured by bowel gas.     Most recent lab work including a [address] and CMP were completed in September 2017 and were most Diabetes Paternal Aunt    • Crohn's Disease Paternal Aunt    • Diabetes Maternal Aunt       Social History: Smoking status: Former Smoker                                                              Packs/day: 1.50      Years: 0.00         Quit date: 5/1/2 warm and well perfused   Lung: effort normal and breath sounds normal, no respiratory distress, wheezes or rales  GI: soft, non-tender exam in all quadrants without rigidity or guarding, non-distended, no abnormal bowel sounds noted, no masses are palpated hernia, IBD, colon polyps, or malignancy. MAC sedation recommended due to patient's preference for sedation. The patient is advised to follow up with the office with new onset or worsening symptoms.      2.  GERD: Well managed on omeprazole 40 mg daily wi endoscopy/enteroscopy with intervention [i.e. polypectomy, ablation, stent placement, etc.] as indicated.         Orders This Visit:    Orders Placed This Encounter      Sed Jimmy Rendon (Automated)      Immunoglobulin A, Qn, Serum (IGA)      Tissue Tra

## 2017-11-16 NOTE — TELEPHONE ENCOUNTER
Scheduled for:  Colonoscopy 65867/EGD [address]  Provider Name: Sandra Will  Date:  12/13/17  Location:  [facility]  Sedation:  MAC  Time:  1:00 pm  Prep: split dose suprep  Meds/Allergies Reconciled?: sulfa  Diagnosis with codes:  Abdominal pain R10.9, altered bowel habit

## 2017-11-16 NOTE — PATIENT INSTRUCTIONS
1. Schedule colonoscopy/EGD with Dr. Adithya Nava w/ MAC    2.  bowel prep from pharmacy - split dose Colyte     3. Continue all medications for procedure     4. Read all bowel prep instructions carefully    5.  AVOID seeds, nuts, popcorn, raw fruits and ve

## 2017-11-17 ENCOUNTER — APPOINTMENT (OUTPATIENT)
Dept: LAB | Facility: HOSPITAL | Age: 32
End: 2017-11-17
Attending: NURSE PRACTITIONER
Payer: COMMERCIAL

## 2017-11-17 DIAGNOSIS — Z83.79 FAMILY HISTORY OF CROHN'S DISEASE: ICD-10-CM

## 2017-11-17 DIAGNOSIS — R19.4 ALTERED BOWEL HABITS: ICD-10-CM

## 2017-11-17 DIAGNOSIS — R10.9 ABDOMINAL CRAMPING: ICD-10-CM

## 2017-11-17 PROCEDURE — 83993 ASSAY FOR CALPROTECTIN FECAL: CPT

## 2017-11-20 ENCOUNTER — PATIENT MESSAGE (OUTPATIENT)
Dept: GASTROENTEROLOGY | Facility: CLINIC | Age: 32
End: 2017-11-20

## 2017-11-20 NOTE — TELEPHONE ENCOUNTER
Ultrasound was completed 11/16/17 - was seen by GI 11/16/17    Jessica Hoyt DO   Family Practice      []Manual[]Template  []Copied  Saw apn and plan is in place.       Electronically signed by Jessica Hoyt DO at 11/16/2017  4:17 PM

## 2017-11-20 NOTE — TELEPHONE ENCOUNTER
From: Gail File  To: MIKAL De Jesus  Sent: 11/20/2017 9:13 AM CST  Subject: Test Results Question    I have a question about TISSUE TRANSGLUTAMINASE AB IGA resulted on 11/17/17, 11:06 AM.    What does this mean?

## 2017-11-20 NOTE — TELEPHONE ENCOUNTER
Routed to [facility].   Looks like IGG still pending to advise further on these results

## 2017-11-21 ENCOUNTER — PATIENT MESSAGE (OUTPATIENT)
Dept: GASTROENTEROLOGY | Facility: CLINIC | Age: 32
End: 2017-11-21

## 2017-11-21 NOTE — TELEPHONE ENCOUNTER
From: Azucena Haro  To: MIKAL Bishop  Sent: 11/21/2017 12:57 PM CST  Subject: Other    OK thank you for getting back to me so soon.  I'm just a little stressed out over this, but basically what you're saying is it probably looks like inflammato

## 2017-11-21 NOTE — TELEPHONE ENCOUNTER
From: Rogenia Hashimoto  To: MIKAL Pollard  Sent: 11/21/2017 3:17 PM CST  Subject: Other    Za Khanna thank you so much and I will have my fingers crossed that blood test and scopes rule all of that out.  Thank you once   Thanks   Kaden Ibarra

## 2017-11-21 NOTE — TELEPHONE ENCOUNTER
From: Francheska Zabala  To: Tarri Ganser, APN  Sent: 11/20/2017 5:26 PM CST  Subject: Other    OK thank you very much for getting back to me. Yes I did receive your message I'm just not hundred percent sure what everything means lol sorry.  I know we're

## 2017-11-22 ENCOUNTER — PATIENT MESSAGE (OUTPATIENT)
Dept: GASTROENTEROLOGY | Facility: CLINIC | Age: 32
End: 2017-11-22

## 2017-11-23 ENCOUNTER — PATIENT MESSAGE (OUTPATIENT)
Dept: FAMILY MEDICINE CLINIC | Facility: CLINIC | Age: 32
End: 2017-11-23

## 2017-11-24 NOTE — TELEPHONE ENCOUNTER
From: Merissa Nesbitt  To: MIKAL Knott  Sent: 11/22/2017 4:05 PM CST  Subject: Test Results Question    Not sure if the other message sent or went through sorry if you get duplicate messages.  I'm just wondering why do I still feel full and bloat

## 2017-11-24 NOTE — TELEPHONE ENCOUNTER
Questions on abd issues should be handled by gi. Not that I don't want to answer,but I don't want to give conflicting info.

## 2017-11-28 ENCOUNTER — PATIENT MESSAGE (OUTPATIENT)
Dept: GASTROENTEROLOGY | Facility: CLINIC | Age: 32
End: 2017-11-28

## 2017-11-28 DIAGNOSIS — R11.11 VOMITING WITHOUT NAUSEA, INTRACTABILITY OF VOMITING NOT SPECIFIED, UNSPECIFIED VOMITING TYPE: Primary | ICD-10-CM

## 2017-11-28 DIAGNOSIS — R63.0 LOSS OF APPETITE: ICD-10-CM

## 2017-11-28 DIAGNOSIS — R63.4 WEIGHT LOSS, UNINTENTIONAL: ICD-10-CM

## 2017-11-30 ENCOUNTER — TELEPHONE (OUTPATIENT)
Dept: GASTROENTEROLOGY | Facility: CLINIC | Age: 32
End: 2017-11-30

## 2017-11-30 NOTE — TELEPHONE ENCOUNTER
[phone] Outpatient Procedures    No authorization needed    [id number]  Radha Gave    Only needed for Ct scan which was already approved    I advised he call back using codes above and check specific benefits for these procedures.

## 2017-11-30 NOTE — TELEPHONE ENCOUNTER
From: Albertina Roles  To: MIKAL Camacho  Sent: 11/28/2017 8:17 AM CST  Subject: Other    I was just checking in to see if you have sent the CT scan Order over yet bc I have called to schedule 3 times and still no order.  Thank you once again and juana

## 2017-12-02 ENCOUNTER — HOSPITAL ENCOUNTER (OUTPATIENT)
Dept: CT IMAGING | Age: 32
Discharge: HOME | End: 2017-12-02
Attending: NURSE PRACTITIONER
Payer: COMMERCIAL

## 2017-12-02 DIAGNOSIS — R63.4 WEIGHT LOSS, UNINTENTIONAL: ICD-10-CM

## 2017-12-02 DIAGNOSIS — R63.0 LOSS OF APPETITE: ICD-10-CM

## 2017-12-02 DIAGNOSIS — R11.11 VOMITING WITHOUT NAUSEA, INTRACTABILITY OF VOMITING NOT SPECIFIED, UNSPECIFIED VOMITING TYPE: ICD-10-CM

## 2017-12-02 PROCEDURE — 74177 CT ABD & PELVIS W/CONTRAST: CPT | Performed by: NURSE PRACTITIONER

## 2017-12-02 PROCEDURE — 82565 ASSAY OF CREATININE: CPT

## 2017-12-04 ENCOUNTER — PATIENT MESSAGE (OUTPATIENT)
Dept: GASTROENTEROLOGY | Facility: CLINIC | Age: 32
End: 2017-12-04

## 2017-12-05 ENCOUNTER — PATIENT MESSAGE (OUTPATIENT)
Dept: GASTROENTEROLOGY | Facility: CLINIC | Age: 32
End: 2017-12-05

## 2017-12-05 NOTE — TELEPHONE ENCOUNTER
From: Rogenia Hashimoto  To:  MIKAL Pollard  Sent: 12/5/2017 12:26 PM CST  Subject: Test Results Question    Ok thank u so much

## 2017-12-05 NOTE — TELEPHONE ENCOUNTER
From: Marybeth Rudolph  To:  MIKAL Ly  Sent: 12/4/2017 10:48 AM CST  Subject: Test Results Question    I have a question about CT ABDOMEN+PELVIS(CONTRAST ONLY)(CPT=74177) resulted on 12/2/17, 11:07 AM.    Thank you for sending the test to me an

## 2017-12-06 ENCOUNTER — PATIENT MESSAGE (OUTPATIENT)
Dept: GASTROENTEROLOGY | Facility: CLINIC | Age: 32
End: 2017-12-06

## 2017-12-07 NOTE — TELEPHONE ENCOUNTER
From: Betzy Mays  To: MIKAL Jiménez  Sent: 12/6/2017 9:38 AM CST  Subject: Other    I have a question, would I be able to take a probiotic like Health radha?   Just want to make sure   Thanks  Sherren Person

## 2017-12-12 ENCOUNTER — TELEPHONE (OUTPATIENT)
Dept: GASTROENTEROLOGY | Facility: CLINIC | Age: 32
End: 2017-12-12

## 2017-12-12 NOTE — TELEPHONE ENCOUNTER
Pt contacted--just wanted to know about clear liquid diet. Reviewed with pt. He has , has contacted PPO and has no further questions.

## 2017-12-13 ENCOUNTER — HOSPITAL ENCOUNTER (OUTPATIENT)
Facility: HOSPITAL | Age: 32
Setting detail: HOSPITAL OUTPATIENT SURGERY
Discharge: HOME | End: 2017-12-13
Attending: INTERNAL MEDICINE | Admitting: INTERNAL MEDICINE
Payer: COMMERCIAL

## 2017-12-13 ENCOUNTER — ANESTHESIA EVENT (OUTPATIENT)
Dept: ENDOSCOPY | Facility: HOSPITAL | Age: 32
End: 2017-12-13
Payer: COMMERCIAL

## 2017-12-13 ENCOUNTER — SURGERY (OUTPATIENT)
Age: 32
End: 2017-12-13

## 2017-12-13 ENCOUNTER — ANESTHESIA (OUTPATIENT)
Dept: ENDOSCOPY | Facility: HOSPITAL | Age: 32
End: 2017-12-13
Payer: COMMERCIAL

## 2017-12-13 VITALS
OXYGEN SATURATION: 100 % | BODY MASS INDEX: 29.33 KG/M2 | HEIGHT: 69 IN | HEART RATE: 67 BPM | DIASTOLIC BLOOD PRESSURE: 83 MMHG | WEIGHT: 198 LBS | SYSTOLIC BLOOD PRESSURE: 118 MMHG | RESPIRATION RATE: 20 BRPM

## 2017-12-13 DIAGNOSIS — R19.4 CHANGE IN BOWEL HABITS: ICD-10-CM

## 2017-12-13 DIAGNOSIS — Z83.79 FHX: CROHN'S DISEASE: ICD-10-CM

## 2017-12-13 DIAGNOSIS — K29.70 GASTRITIS: ICD-10-CM

## 2017-12-13 DIAGNOSIS — R11.11 VOMITING WITHOUT NAUSEA, INTRACTABILITY OF VOMITING NOT SPECIFIED, UNSPECIFIED VOMITING TYPE: ICD-10-CM

## 2017-12-13 DIAGNOSIS — K52.9 COLITIS: Primary | ICD-10-CM

## 2017-12-13 DIAGNOSIS — R63.4 WEIGHT LOSS: ICD-10-CM

## 2017-12-13 DIAGNOSIS — R68.81 EARLY SATIETY: ICD-10-CM

## 2017-12-13 DIAGNOSIS — R63.0 LOSS OF APPETITE: ICD-10-CM

## 2017-12-13 DIAGNOSIS — R10.9 ABDOMINAL PAIN, UNSPECIFIED ABDOMINAL LOCATION: ICD-10-CM

## 2017-12-13 PROCEDURE — 0DBF8ZX EXCISION OF RIGHT LARGE INTESTINE, VIA NATURAL OR ARTIFICIAL OPENING ENDOSCOPIC, DIAGNOSTIC: ICD-10-PCS | Performed by: INTERNAL MEDICINE

## 2017-12-13 PROCEDURE — 0DB68ZX EXCISION OF STOMACH, VIA NATURAL OR ARTIFICIAL OPENING ENDOSCOPIC, DIAGNOSTIC: ICD-10-PCS | Performed by: INTERNAL MEDICINE

## 2017-12-13 PROCEDURE — 0DB98ZX EXCISION OF DUODENUM, VIA NATURAL OR ARTIFICIAL OPENING ENDOSCOPIC, DIAGNOSTIC: ICD-10-PCS | Performed by: INTERNAL MEDICINE

## 2017-12-13 PROCEDURE — 45380 COLONOSCOPY AND BIOPSY: CPT | Performed by: INTERNAL MEDICINE

## 2017-12-13 PROCEDURE — 43239 EGD BIOPSY SINGLE/MULTIPLE: CPT | Performed by: INTERNAL MEDICINE

## 2017-12-13 PROCEDURE — 0DBG8ZX EXCISION OF LEFT LARGE INTESTINE, VIA NATURAL OR ARTIFICIAL OPENING ENDOSCOPIC, DIAGNOSTIC: ICD-10-PCS | Performed by: INTERNAL MEDICINE

## 2017-12-13 RX ORDER — LIDOCAINE HYDROCHLORIDE 10 MG/ML
INJECTION, SOLUTION EPIDURAL; INFILTRATION; INTRACAUDAL; PERINEURAL AS NEEDED
Status: DISCONTINUED | OUTPATIENT
Start: 2017-12-13 | End: 2017-12-13 | Stop reason: SURG

## 2017-12-13 RX ORDER — SODIUM CHLORIDE, SODIUM LACTATE, POTASSIUM CHLORIDE, CALCIUM CHLORIDE 600; 310; 30; 20 MG/100ML; MG/100ML; MG/100ML; MG/100ML
INJECTION, SOLUTION INTRAVENOUS CONTINUOUS
Status: DISCONTINUED | OUTPATIENT
Start: 2017-12-13 | End: 2017-12-13

## 2017-12-13 RX ORDER — NALOXONE HYDROCHLORIDE 0.4 MG/ML
80 INJECTION, SOLUTION INTRAMUSCULAR; INTRAVENOUS; SUBCUTANEOUS AS NEEDED
Status: DISCONTINUED | OUTPATIENT
Start: 2017-12-13 | End: 2017-12-13

## 2017-12-13 RX ADMIN — SODIUM CHLORIDE, SODIUM LACTATE, POTASSIUM CHLORIDE, CALCIUM CHLORIDE: 600; 310; 30; 20 INJECTION, SOLUTION INTRAVENOUS at 12:57:00

## 2017-12-13 RX ADMIN — LIDOCAINE HYDROCHLORIDE 50 MG: 10 INJECTION, SOLUTION EPIDURAL; INFILTRATION; INTRACAUDAL; PERINEURAL at 13:03:00

## 2017-12-13 NOTE — ANESTHESIA POSTPROCEDURE EVALUATION
Patient: Yanelis Harrison    Procedure Summary     Date:  12/13/17 Room / Location:  [facility] ENDOSCOPY 01 / [facility] ENDOSCOPY    Anesthesia Start:  9628 Anesthesia Stop:  4786    Procedures:       COLONOSCOPY (N/A )      ESOPHAGOGASTRODUODENOSCOPY (EGD) (N/A ) Kory Gannon

## 2017-12-13 NOTE — OPERATIVE REPORT
[facility] - [facility] Endoscopy Report      Preoperative Diagnosis:  - change in bowel habits  - abdominal pain      Postoperative Diagnosis:  - patchy colitis right colon  - small internal hemorrhoids  - gastritis      Procedure:    Colonoscopy   Eso taken.   The duodenal bulb and post bulbar regions were normal. Biopsies taken.        Impression:  - patchy colitis right colon  - small internal hemorrhoids  - gastritis    Recommendations:  - Post procedure instructions given  - Follow up on biopsies-cli

## 2017-12-13 NOTE — ANESTHESIA PREPROCEDURE EVALUATION
Anesthesia PreOp Note    HPI:     Jenn Freitas is a 27year old male who presents for preoperative consultation requested by: Deana Tyson MD    Date of Surgery: 12/13/2017    Procedure(s):  COLONOSCOPY  ESOPHAGOGASTRODUODENOSCOPY (EGD)  Indicati Hives    Family History   Problem Relation Age of Onset   • Hypertension Father    • Diabetes Mother    • Cancer Maternal Grandmother 61     Cancer-Unknown   • Hypertension Maternal Grandfather    • Heart Disease Maternal Grandfather    • Cancer Maternal G Pulmonary - normal exam    breath sounds clear to auscultation    ROS comment: Former smoker  Cardiovascular - negative ROS and normal exam  Exercise tolerance: good    Rhythm: regular  Rate: normal    Neuro/Psych - negative ROS     GI/Hepatic/Renal      C

## 2017-12-13 NOTE — CONSULTS
History & Physical Examination    Patient Name: Haven Ulloa  MRN: [id number]  CSN: [id number]  YOB: 1985    Diagnosis: change in bowel habits, abdominal pain        Prescriptions Prior to Admission:  PEG 3350-KCl-NaBcb-NaCl-NaSulf (COLY Normal Check if Physical Exam is Normal If not normal, please explain:   HEENT [x ] [ x]    NECK & BACK [x ] [x ]    HEART [x ] [ x]    LUNGS [x ] [ x]    ABDOMEN [x ] [x ]    UROGENITAL [ ] [ ]    EXTREMITIES [x ] [x ]    OTHER        [ x ] I have discuss

## 2017-12-14 NOTE — H&P
History & Physical Examination    Patient Name: Azucena Haro  MRN: [id number]  [facility]: [id number]  YOB: 1985    Diagnosis: Abdominal pain, change in bowel habits        No prescriptions prior to admission.     No current facility-administere

## 2017-12-19 ENCOUNTER — TELEPHONE (OUTPATIENT)
Dept: GASTROENTEROLOGY | Facility: CLINIC | Age: 32
End: 2017-12-19

## 2017-12-19 DIAGNOSIS — R10.9 ABDOMINAL PAIN, UNSPECIFIED ABDOMINAL LOCATION: Primary | ICD-10-CM

## 2017-12-19 DIAGNOSIS — R63.4 WEIGHT LOSS: ICD-10-CM

## 2017-12-19 NOTE — TELEPHONE ENCOUNTER
Spoke with pt, inflammatory results discussed, informed that [facility] has not commented on reports as of yet. Will forward message and ask for his recommendations. Pt is concerned about ulceration and possible Crohn's as previously discussed with [facility].   Please

## 2017-12-20 NOTE — TELEPHONE ENCOUNTER
Results of colonoscopy and EGD discussed. Small, punctate white based ulcerations were noted in the right colon cecal region. These were biopsied. Is not definitive Crohn's-like histology.   Was thought to be related to medications, infection or divertic

## 2017-12-20 NOTE — TELEPHONE ENCOUNTER
Spoke with [facility] in Radiology and she double checked with radiologist regarding titanium rods - - will not affect MR enterography and could still proceed with study. Please enter orders.   Will need approval through managed care before scheduling

## 2017-12-20 NOTE — TELEPHONE ENCOUNTER
Reviewed below. He is aware to await call from [facility] once approved.

## 2017-12-24 ENCOUNTER — PATIENT MESSAGE (OUTPATIENT)
Dept: GASTROENTEROLOGY | Facility: CLINIC | Age: 32
End: 2017-12-24

## 2017-12-27 ENCOUNTER — TELEPHONE (OUTPATIENT)
Dept: GASTROENTEROLOGY | Facility: CLINIC | Age: 32
End: 2017-12-27

## 2017-12-27 NOTE — TELEPHONE ENCOUNTER
The patient was contacted, he is been experiencing intermittent episodes of short or limited weakness and/or dizziness. He has lost a lot of weight and is undergoing evaluation for poor appetite, irregular bowel habits, ruling out IBD.   I have advised my

## 2017-12-27 NOTE — TELEPHONE ENCOUNTER
Dr Sánchez Barahona note below. I spoke to pt and recommended that he call PCP re dizziness and lightheadedness, as it could be from causes other than decreased food. Pt states he is eating, but just has a poor appetite, so not eating much.  Even after he's t

## 2017-12-27 NOTE — TELEPHONE ENCOUNTER
From: Ty Hair  To: MIKAL Ortega  Sent: 12/24/2017 10:03 AM CST  Subject: Other    Just had a question, the last week or so every once and A while I'll get very dizzy, lightheaded feel like I have to sit down for a while.  My wife wanted me

## 2018-01-04 NOTE — TELEPHONE ENCOUNTER
Spoke with Travis and she will start the approval process for MR Enterography. Aware she needs to use codes for both MRI abdomen and MRI pelvis for this.

## 2018-01-15 ENCOUNTER — PATIENT MESSAGE (OUTPATIENT)
Dept: GASTROENTEROLOGY | Facility: CLINIC | Age: 33
End: 2018-01-15

## 2018-01-15 NOTE — TELEPHONE ENCOUNTER
From: Jaja Jennings  To: MIKAL Valdovinos  Sent: 1/15/2018 7:20 AM CST  Subject: Other    I am supposed to have an mri done tomowwo pre dr. Lexi Cox but I was told to call [facility] today to make sure my insurance is covering it.  I tried to ca

## 2018-01-16 ENCOUNTER — TELEPHONE (OUTPATIENT)
Dept: GASTROENTEROLOGY | Facility: CLINIC | Age: 33
End: 2018-01-16

## 2018-01-16 ENCOUNTER — HOSPITAL ENCOUNTER (OUTPATIENT)
Dept: MRI IMAGING | Facility: HOSPITAL | Age: 33
Discharge: HOME | End: 2018-01-16
Attending: INTERNAL MEDICINE
Payer: COMMERCIAL

## 2018-01-16 ENCOUNTER — HOSPITAL ENCOUNTER (OUTPATIENT)
Dept: MRI IMAGING | Facility: HOSPITAL | Age: 33
End: 2018-01-16
Attending: INTERNAL MEDICINE
Payer: COMMERCIAL

## 2018-01-16 DIAGNOSIS — R93.5 ABNORMAL ABDOMINAL MRI: Primary | ICD-10-CM

## 2018-01-16 DIAGNOSIS — R63.4 WEIGHT LOSS: ICD-10-CM

## 2018-01-16 DIAGNOSIS — R10.9 ABDOMINAL PAIN, UNSPECIFIED ABDOMINAL LOCATION: ICD-10-CM

## 2018-01-16 PROCEDURE — 74183 MRI ABD W/O CNTR FLWD CNTR: CPT | Performed by: INTERNAL MEDICINE

## 2018-01-16 PROCEDURE — 72197 MRI PELVIS W/O & W/DYE: CPT | Performed by: INTERNAL MEDICINE

## 2018-01-17 PROCEDURE — A9575 INJ GADOTERATE MEGLUMI 0.1ML: HCPCS | Performed by: INTERNAL MEDICINE

## 2018-01-17 NOTE — TELEPHONE ENCOUNTER
Results of the MRI were discussed with the patient at length. I discussed possible causes of intussusception. I would advise that he follow-up with a capsule endoscopy. Patient agrees to proceed however he is concerned about capsule retention.   We do pl

## 2018-01-17 NOTE — TELEPHONE ENCOUNTER
Abdominal xray ordered and will be forwarded to Dr. Natalya Nina when resulted.      To GI Scheduling to set up patency capsule, then capsule study

## 2018-01-17 NOTE — TELEPHONE ENCOUNTER
Collin REN--re below, would you mind putting int the abdominal x-ray that is done 30 hrs after patency capsule is swallowed? Thanks. Forwarded to GI schedulers to set up first a patency capsule, then the capsule study. Thanks.

## 2018-01-19 NOTE — TELEPHONE ENCOUNTER
GI/RN-    This patients patency capsule was scheduled (see below), please send prep via [employer].  Thank you

## 2018-01-19 NOTE — TELEPHONE ENCOUNTER
Scheduled for:  Patency Capsule 73872  Provider Name: Dr. Francois Copeland  Date:  1/24/18, this date was okay'd per [facility] Endo/RN  Location:  [facility]  Sedation:  None  Time:  0730 (pt is aware to arrive at 0645)   Prep:  Clear liquid diet after lunch and dinner   NPO

## 2018-01-22 ENCOUNTER — TELEPHONE (OUTPATIENT)
Dept: GASTROENTEROLOGY | Facility: CLINIC | Age: 33
End: 2018-01-22

## 2018-01-22 DIAGNOSIS — R93.5 ABNORMAL MRI OF THE ABDOMEN: Primary | ICD-10-CM

## 2018-01-22 NOTE — TELEPHONE ENCOUNTER
Patency capsule instructions sent through my Chart. Pt notified and all reviewed. He will go to [address] [place] [address] facility for abd x-ray 30 hours after swallowing the capsule.

## 2018-01-22 NOTE — TELEPHONE ENCOUNTER
Pt states [employer] needs info for PA - pls call  [phone]    Or [phone]- [facility] is looking for DR explanation on why pt needs procedure

## 2018-01-23 NOTE — TELEPHONE ENCOUNTER
Pt transferred from phone room, wanting to know if below patency capsule was authorized yet. I call [address] below and was on hold. I finally spoke to [facility]. She states it has not been approved and could take 72 hrs even with urgent.  She confirmed that the

## 2018-01-24 NOTE — TELEPHONE ENCOUNTER
Scheduled for:  Patency Capsule 37807  Provider Name: Dr. Antoni Espino  Date:  2/12/18  Location:  [facility]  Sedation:  None  Time:  0730 (pt is aware to arrive at 0645)   Prep:  Clear liquid diet after lunch and dinner   NPO after midnight  Meds/Allergies Reconciled?

## 2018-02-06 NOTE — TELEPHONE ENCOUNTER
I contacted [facility] again, as we have not heard back yet. Was on hold for 35 minutes and never got through to a rep. Will await fax re determination.

## 2018-02-08 NOTE — TELEPHONE ENCOUNTER
Late entry:I contacted [address] again early afternoon today, as we never heard back and pt scheduled again for 2/12 patency capsule.  I contacted them again at below number --apparently they also need a predetermination form to be filled out which was not menti

## 2018-02-13 NOTE — TELEPHONE ENCOUNTER
Scheduled for:  Patency capsule 28410  Provider Name: Dr. Aaron Rogers  Date:  2/28/18  Location:  [facility]  Sedation:  None  Time:  0730 (pt is aware to arrive at 0645)   Prep:  Clear liquid diet for lunch and dinner   NPO after midnight  Meds/Allergies Reconciled?:

## 2018-02-20 ENCOUNTER — TELEPHONE (OUTPATIENT)
Dept: GASTROENTEROLOGY | Facility: CLINIC | Age: 33
End: 2018-02-20

## 2018-02-20 DIAGNOSIS — R93.5 ABNORMAL MRI OF THE ABDOMEN: Primary | ICD-10-CM

## 2018-02-20 NOTE — TELEPHONE ENCOUNTER
Pt states he rec'vd letter from [facility] stating procedure for 2/28/2018 Patency Capsule Endoscopy has been denied. [facility] told pt that PL will have to do Peer to Peer - pt requesting PL to call [facility] at [phone]. Pt also requesting RN/PL for a cb.   Thank y

## 2018-02-21 NOTE — TELEPHONE ENCOUNTER
Spoke to pt, advised per Dr Antoni Espino 2/21/18 note, pt agreed and requested we contact him when we receive decision from INS.

## 2018-02-21 NOTE — TELEPHONE ENCOUNTER
Spoke to Dr. Brenna Rae regarding peer to peer for CE. He is aware.   PL to call [facility] at [phone]  ID #[id number]

## 2018-02-21 NOTE — TELEPHONE ENCOUNTER
RN to notify pt that I had to schedule time to talk to medical director about the patency capsule.    This is set up for Monday afternoon

## 2018-02-26 NOTE — TELEPHONE ENCOUNTER
Pt calling to check status on approval for procedure scheduled for 2/28/18.  Please call thank you [phone]

## 2018-02-26 NOTE — TELEPHONE ENCOUNTER
RN to put in appeal - I discussed with Dr. Wilfred Painting today and he would support review/appeal for the patency capsule.      Please update pt

## 2018-02-26 NOTE — TELEPHONE ENCOUNTER
Pt contacted and given message from Dr Ruby Luther today encounter. . We will cancel for now and begin appeal process.  Dr Eleonora Rosario, could you please generate a letter explaining the reason the patency must be done first, the poss problem with obstruction/complicat

## 2018-03-08 NOTE — TELEPHONE ENCOUNTER
Dr Marilou Sandhu, I spoke to pt to update him on appeal. He said to let you know he is better today, had 2 good bowel movements. Usually about every 3 days has normal stools. Yesterday none, Tuesday one small, but ate sausage biscuit which caused problem.  Now feel

## 2018-03-08 NOTE — TELEPHONE ENCOUNTER
Any word on the appeal process - the doctor I discussed with had stated that a letter was not needed to generate the appeal - the patency capsule is not considered experimental ( part of the denial language)

## 2018-03-16 ENCOUNTER — HOSPITAL ENCOUNTER (OUTPATIENT)
Age: 33
Discharge: HOME | End: 2018-03-16
Payer: COMMERCIAL

## 2018-03-16 VITALS
SYSTOLIC BLOOD PRESSURE: 119 MMHG | RESPIRATION RATE: 18 BRPM | DIASTOLIC BLOOD PRESSURE: 82 MMHG | HEIGHT: 68 IN | HEART RATE: 64 BPM | BODY MASS INDEX: 29.1 KG/M2 | OXYGEN SATURATION: 100 % | TEMPERATURE: 98 F | WEIGHT: 192 LBS

## 2018-03-16 DIAGNOSIS — J40 BRONCHITIS: Primary | ICD-10-CM

## 2018-03-16 PROCEDURE — 99214 OFFICE O/P EST MOD 30 MIN: CPT

## 2018-03-16 PROCEDURE — 99213 OFFICE O/P EST LOW 20 MIN: CPT

## 2018-03-16 RX ORDER — AZITHROMYCIN 250 MG/1
TABLET, FILM COATED ORAL
Qty: 1 PACKAGE | Refills: 0 | Status: SHIPPED | OUTPATIENT
Start: 2018-03-16 | End: 2018-03-21

## 2018-03-16 RX ORDER — ALBUTEROL SULFATE 90 UG/1
2 AEROSOL, METERED RESPIRATORY (INHALATION) EVERY 4 HOURS PRN
Qty: 1 INHALER | Refills: 0 | Status: SHIPPED | OUTPATIENT
Start: 2018-03-16 | End: 2018-04-15

## 2018-03-16 RX ORDER — BENZONATATE 100 MG/1
200 CAPSULE ORAL 3 TIMES DAILY PRN
Qty: 30 CAPSULE | Refills: 0 | Status: SHIPPED | OUTPATIENT
Start: 2018-03-16 | End: 2018-07-09 | Stop reason: ALTCHOICE

## 2018-03-16 NOTE — ED PROVIDER NOTES
Patient presents with:  Cough/URI      HPI:     Pablo Mtz is a 27year old male who presents for evaluation and management of a chief complaint of cough without respiratory distress for the past week.   There were positive sick contacts at home paty History Narrative   None on file        ROS:   Positive for stated complaint: cough, congestion  All other systems reviewed and negative except as noted above. Constitutional and Vital Signs Reviewed.     Physical Exam:     Findings:    /82   Pulse 6 All results reviewed and discussed with patient. See AVS for detailed discharge instructions for your condition today.     Follow Up with:  Carol Orourke DO  [address] [place] [address] [phone]    Schedule an appointment a

## 2018-03-20 ENCOUNTER — PATIENT MESSAGE (OUTPATIENT)
Dept: GASTROENTEROLOGY | Facility: CLINIC | Age: 33
End: 2018-03-20

## 2018-03-23 ENCOUNTER — TELEPHONE (OUTPATIENT)
Dept: FAMILY MEDICINE CLINIC | Facility: CLINIC | Age: 33
End: 2018-03-23

## 2018-03-23 ENCOUNTER — OFFICE VISIT (OUTPATIENT)
Dept: FAMILY MEDICINE CLINIC | Facility: CLINIC | Age: 33
End: 2018-03-23

## 2018-03-23 VITALS
WEIGHT: 194.25 LBS | HEIGHT: 68 IN | BODY MASS INDEX: 29.44 KG/M2 | SYSTOLIC BLOOD PRESSURE: 112 MMHG | DIASTOLIC BLOOD PRESSURE: 75 MMHG | HEART RATE: 65 BPM

## 2018-03-23 DIAGNOSIS — R05.9 COUGH: Primary | ICD-10-CM

## 2018-03-23 PROCEDURE — 99212 OFFICE O/P EST SF 10 MIN: CPT | Performed by: FAMILY MEDICINE

## 2018-03-23 PROCEDURE — 99213 OFFICE O/P EST LOW 20 MIN: CPT | Performed by: FAMILY MEDICINE

## 2018-03-23 RX ORDER — FLUTICASONE FUROATE AND VILANTEROL 200; 25 UG/1; UG/1
1 POWDER RESPIRATORY (INHALATION) DAILY
Qty: 1 EACH | Refills: 3 | Status: SHIPPED | OUTPATIENT
Start: 2018-03-23 | End: 2018-07-09 | Stop reason: ALTCHOICE

## 2018-03-23 NOTE — PROGRESS NOTES
Blood pressure 112/75, pulse 65, height 5' 8\" (1.727 m), weight 194 lb 4 oz (88.1 kg).     Patient presents today following up for a bronchitis started 2 weeks ago nocturnal cough improved minimal nasal congestion some loss of voice occasional dyspnea but

## 2018-03-23 NOTE — TELEPHONE ENCOUNTER
Patient calling due to was prescribed fluticasone-salmeterol (ADVAIR HFA) 230-21 MCG/ACT Inhalation Aerosol Today and pharamacy does not have instock and med is over $100.00 Copay. Patient requesting if alternative medication can be prescribed.      Please contact patient and pharmacy as the patient is at the pharmacy

## 2018-04-05 NOTE — TELEPHONE ENCOUNTER
Pt calling for an update on appeal, indicates he has stomach issues and only continues to get worse. [facility]:[phone],[id number].

## 2018-04-06 NOTE — TELEPHONE ENCOUNTER
Pt contacted and he states he feels the \"same as before. \" States he has a sausage biscuit today and his stomach feels \"like a rock, \" only able to tolerate popcorn.  Having 2-3 diarrhea and semi-formed stools/day, sometimes \"darker color,\" decreased a

## 2018-04-10 NOTE — TELEPHONE ENCOUNTER
Incoming call received from Trudi Pollard at [facility],  ([phone]). She states she received the appeal paperwork from today and wants to know if this is life threatening in order to be treated as \"urgent\" case for CPT 81281.     I reviewe

## 2018-04-10 NOTE — TELEPHONE ENCOUNTER
Spent 65 minutes on the phone with Whit Resendez, ([phone]) and they state the appeals forms they received via mail, was written on a pre-determination form and was therefore unable to be processed.      Requesting this to be resubmitted AGAIN on correct

## 2018-04-11 ENCOUNTER — TELEPHONE (OUTPATIENT)
Dept: GASTROENTEROLOGY | Facility: CLINIC | Age: 33
End: 2018-04-11

## 2018-04-11 NOTE — TELEPHONE ENCOUNTER
Form from [facility] was received   Identification# [id number]  Group #: [id number]  Procedure code 60130 (Patentcy Capsule study) has been Denied per 2017 [facility]) [facility] [id number]   Sent for Scanning

## 2018-04-13 NOTE — TELEPHONE ENCOUNTER
From: Jaja Jennings  To: MIKAL Valdovinos  Sent: 3/20/2018 9:02 PM CDT  Subject: Other    Hello my name is Elen Lopez, birthday October 1, 1985. I am currently seeing Dr. Lexi Cox for stomach issues.  I know we're waiting on my insurance to come t

## 2018-04-17 NOTE — TELEPHONE ENCOUNTER
See TE from 4/11/18, per Giselle Cummings:    \"Form from [facility] was received   Identification# [id number]  Group #: [id number]  Procedure code 67925 (Patentcy Capsule study) has been Denied per 2017 [facility] Medical Policy Guideline FCI999.83

## 2018-04-18 NOTE — TELEPHONE ENCOUNTER
Would the pt want to go ahead with the capsule without the patency  - the risk is 4 % for retained capsule.

## 2018-04-19 ENCOUNTER — TELEPHONE (OUTPATIENT)
Dept: GASTROENTEROLOGY | Facility: CLINIC | Age: 33
End: 2018-04-19

## 2018-04-19 NOTE — TELEPHONE ENCOUNTER
Dr Tanika Sultana, pt contacted and given your message below. He would like to speak to you regarding more details about the risk, and what surgery would be required if capsule retained. Thanks.

## 2018-04-24 NOTE — TELEPHONE ENCOUNTER
Pt called back and states we must get predetermination from [facility]. I called [phone] and spoke to Jaida Lucas, who gave me ref #[id number].  We need to fill out predetermination form, and fax along with records to [facility] Review [facility], fax [phone]

## 2018-04-24 NOTE — TELEPHONE ENCOUNTER
The patient was contacted, I discussed his symptoms, chronic intermittent abdominal pain, intermittent bloating, irregular bowel habits with occasional loose stools as well as question of intussusception. He has had about a 50 pound weight loss as well.

## 2018-04-24 NOTE — TELEPHONE ENCOUNTER
Pt contacted and given CPT code [phone] to call insurance. I instructed to ask if it was a covered procedure, does it require prior auth, and what direct number can we call if so--the provider line does not allow us to speak to a person.

## 2018-05-14 NOTE — TELEPHONE ENCOUNTER
I contacted [facility] Blake 150 again below and spoke to [facility] for 40 minutes re all below. She did not appear to know what she was doing.  States a denial letter for capsule CPT [phone] went out 4/25--we did not received, but pt contacted and states he got a letter stat

## 2018-05-15 NOTE — TELEPHONE ENCOUNTER
Dr Maria De Jesus Royal, in addition to below request for detailed letter, please include the name of procedure CPT code 53861: INTRALUMINAL CAPSULE ENDOSCOPY, [address] REPORT, and what you are looking for/why you need th

## 2018-05-16 NOTE — TELEPHONE ENCOUNTER
One number missing in CPT code, I made correction and Faxed to [address] [phone] as per below, along with all other records that have been previously sent to them--33 pages.  Pt notified--the letter with incorrect code went to [place], but I explain

## 2018-05-18 ENCOUNTER — APPOINTMENT (OUTPATIENT)
Dept: OCCUPATIONAL MEDICINE | Age: 33
End: 2018-05-18
Attending: EMERGENCY MEDICINE

## 2018-05-21 NOTE — TELEPHONE ENCOUNTER
Manpreet Sinclair from [facility] called back, we reviewed letter and records sent--they have all they need and she is forwarding it to MD and will personally call MD to ask to expedite and approve. She will call us back. I updated pt.  He states he will be starting a ne

## 2018-06-05 NOTE — TELEPHONE ENCOUNTER
After spending 49 min on the phone with Cailin Aaron at [facility], I was told that Capsule endoscopy was approved as of 5/29/18, and that I was sending high priority to GI schedulers and spoke to [address] [place] [address].  There is no expiration--is good as long as pt has no health c

## 2018-06-05 NOTE — TELEPHONE ENCOUNTER
Pt received letter from [facility] about possible approval on capsule endoscopy. Pt would like to know if this office received this letter also. Please call. Pt is at work until 3:30pm so OK to leave detailed message.

## 2018-06-05 NOTE — TELEPHONE ENCOUNTER
Spoke with pt, he was at work. He will CB when he knows what day he can take off of work. I explained to the pt that this can be done any Thursday or Friday at [address] [place] [address] & he will have to arrive at 6:45 am. He will CB ASAP to schedule.     Please transfer to Aubrie

## 2018-06-06 NOTE — TELEPHONE ENCOUNTER
Scheduled for:  VCE - 39583  Provider Name:  Dr. Gen Mixon  Date:  6/28/18 - ok'd per Nakia in Endo  Location:  [facility]  Sedation:  NONE  Time:  7:30 am (pt is aware to arrive at 6:45 am)  Prep:  NPO after midnight, Prep instructions were given to pt over the p

## 2018-06-20 NOTE — TELEPHONE ENCOUNTER
Received approval letter from [facility] with appeal ID #[id number]    Letter sent to scanning.

## 2018-06-27 ENCOUNTER — TELEPHONE (OUTPATIENT)
Dept: GASTROENTEROLOGY | Facility: CLINIC | Age: 33
End: 2018-06-27

## 2018-06-27 NOTE — TELEPHONE ENCOUNTER
Pt contacted and reviewed:  Arrival 6:45 am tomorrow and confirmed directions to em endo  Reviewed clear liquids only starting after lunch today  Take 1/2 bottle Magnesium Citrate at 6pm tonight  Nothing to eat or drink 10 hrs prior to procedure except si

## 2018-06-28 ENCOUNTER — HOSPITAL ENCOUNTER (OUTPATIENT)
Facility: HOSPITAL | Age: 33
Setting detail: HOSPITAL OUTPATIENT SURGERY
Discharge: HOME | End: 2018-06-28
Attending: INTERNAL MEDICINE | Admitting: INTERNAL MEDICINE
Payer: COMMERCIAL

## 2018-06-28 ENCOUNTER — SURGERY (OUTPATIENT)
Age: 33
End: 2018-06-28

## 2018-06-28 VITALS
BODY MASS INDEX: 28.44 KG/M2 | HEIGHT: 69 IN | DIASTOLIC BLOOD PRESSURE: 71 MMHG | SYSTOLIC BLOOD PRESSURE: 119 MMHG | HEART RATE: 62 BPM | OXYGEN SATURATION: 100 % | WEIGHT: 192 LBS | RESPIRATION RATE: 12 BRPM

## 2018-06-28 DIAGNOSIS — R93.5 ABNORMAL MRI OF THE ABDOMEN: ICD-10-CM

## 2018-06-28 PROCEDURE — 0DJ07ZZ INSPECTION OF UPPER INTESTINAL TRACT, VIA NATURAL OR ARTIFICIAL OPENING: ICD-10-PCS | Performed by: INTERNAL MEDICINE

## 2018-06-28 PROCEDURE — 91110 GI TRC IMG INTRAL ESOPH-ILE: CPT | Performed by: INTERNAL MEDICINE

## 2018-06-29 ENCOUNTER — TELEPHONE (OUTPATIENT)
Dept: GASTROENTEROLOGY | Facility: CLINIC | Age: 33
End: 2018-06-29

## 2018-06-29 NOTE — TELEPHONE ENCOUNTER
The patient was contacted results of his recent capsule were reviewed. No specific cause for his symptoms. I have advised that we monitor for now. He reports he still has a poor appetite but his weight has been stable.   I like to get him back in the off

## 2018-06-29 NOTE — H&P
History & Physical Examination    Patient Name: Governor Mari  MRN: [id number]  [facility]: [id number]  YOB: 1985    Diagnosis: abdominal pain, abnormal MRI    No prescriptions prior to admission.     No current facility-administered medications care.  [ x ] I have reviewed the History and Physical done within the last 30 days. Any changes noted above. Johan Ndiaye.  Samson  6/29/2018  12:38 PM

## 2018-06-29 NOTE — OPERATIVE REPORT
[facility] Endoscopy Report      Preoperative Diagnosis:  - abdominal pain  - abnormal MRI       Postoperative Diagnosis:  - gastritis   - focal areas of small bowel erythema       Procedure:    Capsule Endoscopy ( Pillcam)     Surgeon:  Pa

## 2018-07-09 NOTE — PROGRESS NOTES
\"all the things going on the last couple years . I just started a job a few weeks ago. Some times I get a little depressed. \"  I may get a little angry. Now that I'm working, the anger has been better.   Doesn't get physical with his anger  He walks cesar

## 2018-10-03 ENCOUNTER — PATIENT MESSAGE (OUTPATIENT)
Dept: FAMILY MEDICINE CLINIC | Facility: CLINIC | Age: 33
End: 2018-10-03

## 2018-10-04 NOTE — TELEPHONE ENCOUNTER
From: Thomas Farrar  To: Emy Don DO  Sent: 10/3/2018 7:23 AM CDT  Subject: Non-Urgent Medical Question    Hello as you know I have cold sores and I usually take the pills to help with problem.  But I am out of the pills and need new prescrip

## 2018-10-04 NOTE — TELEPHONE ENCOUNTER
[facility], please see pt MyChart message and advise if you know about the laser therapy pt is asking about. Rx pended for review as does not fall under a protocol.      Last Rx= 5/3/17 #4 & 6-R by [facility] [place] and has since been d/c'd from med [facility]    Recent Outpatient Vi

## 2018-10-05 RX ORDER — VALACYCLOVIR HYDROCHLORIDE 1 G/1
TABLET, FILM COATED ORAL
Qty: 4 TABLET | Refills: 5 | Status: SHIPPED | OUTPATIENT
Start: 2018-10-05 | End: 2019-02-26

## 2018-11-14 ENCOUNTER — IMMUNIZATION (OUTPATIENT)
Dept: FAMILY MEDICINE CLINIC | Facility: CLINIC | Age: 33
End: 2018-11-14
Payer: COMMERCIAL

## 2018-11-14 DIAGNOSIS — Z23 NEED FOR VACCINATION: ICD-10-CM

## 2018-11-14 PROCEDURE — 90686 IIV4 VACC NO PRSV 0.5 ML IM: CPT | Performed by: NURSE PRACTITIONER

## 2018-11-14 PROCEDURE — 90471 IMMUNIZATION ADMIN: CPT | Performed by: NURSE PRACTITIONER

## 2018-12-10 ENCOUNTER — OFFICE VISIT (OUTPATIENT)
Dept: FAMILY MEDICINE CLINIC | Facility: CLINIC | Age: 33
End: 2018-12-10
Payer: COMMERCIAL

## 2018-12-10 ENCOUNTER — HOSPITAL ENCOUNTER (OUTPATIENT)
Dept: GENERAL RADIOLOGY | Age: 33
Discharge: HOME | End: 2018-12-10
Attending: FAMILY MEDICINE
Payer: COMMERCIAL

## 2018-12-10 VITALS
HEART RATE: 62 BPM | DIASTOLIC BLOOD PRESSURE: 76 MMHG | SYSTOLIC BLOOD PRESSURE: 114 MMHG | WEIGHT: 190 LBS | BODY MASS INDEX: 29 KG/M2 | TEMPERATURE: 98 F

## 2018-12-10 DIAGNOSIS — M25.511 ACUTE PAIN OF RIGHT SHOULDER: Primary | ICD-10-CM

## 2018-12-10 DIAGNOSIS — M25.511 ACUTE PAIN OF RIGHT SHOULDER: ICD-10-CM

## 2018-12-10 PROCEDURE — 73030 X-RAY EXAM OF SHOULDER: CPT | Performed by: FAMILY MEDICINE

## 2018-12-10 PROCEDURE — 99212 OFFICE O/P EST SF 10 MIN: CPT | Performed by: FAMILY MEDICINE

## 2018-12-10 PROCEDURE — 99213 OFFICE O/P EST LOW 20 MIN: CPT | Performed by: FAMILY MEDICINE

## 2018-12-10 RX ORDER — NABUMETONE 750 MG/1
750 TABLET, FILM COATED ORAL 2 TIMES DAILY
Qty: 60 TABLET | Refills: 0 | Status: SHIPPED | OUTPATIENT
Start: 2018-12-10 | End: 2019-01-09

## 2018-12-11 NOTE — PROGRESS NOTES
Has been workingout with weights  Right shoulder pain after shoveling  Has been painful for 3 weekds  Pain in front of shoulder. Pain with sleeping.     Rt shoudler  No apprehension  Pain over the ac joint  No crepitus    A/p  Right shoulder bursitis  Dang

## 2019-01-21 ENCOUNTER — OFFICE VISIT (OUTPATIENT)
Dept: FAMILY MEDICINE CLINIC | Facility: CLINIC | Age: 34
End: 2019-01-21
Payer: COMMERCIAL

## 2019-01-21 VITALS
HEIGHT: 68 IN | WEIGHT: 194 LBS | TEMPERATURE: 98 F | SYSTOLIC BLOOD PRESSURE: 118 MMHG | BODY MASS INDEX: 29.4 KG/M2 | HEART RATE: 63 BPM | DIASTOLIC BLOOD PRESSURE: 78 MMHG

## 2019-01-21 DIAGNOSIS — M19.011 OSTEOARTHRITIS, LOCALIZED, SHOULDER, RIGHT: Primary | ICD-10-CM

## 2019-01-21 PROCEDURE — 20610 DRAIN/INJ JOINT/BURSA W/O US: CPT | Performed by: FAMILY MEDICINE

## 2019-01-21 RX ORDER — TRIAMCINOLONE ACETONIDE 40 MG/ML
40 INJECTION, SUSPENSION INTRA-ARTICULAR; INTRAMUSCULAR ONCE
Status: SHIPPED | OUTPATIENT
Start: 2019-01-21

## 2019-01-21 NOTE — PROGRESS NOTES
Rt shoulder still uncofmorable with certain positions  Negative apprehension negative infraspinatus supraspinatus. Positive crepitus. Good ulnar radial pulses normal  strength normal upper body strength.   Assessment osteoarthritis the right shoulder

## 2019-01-29 RX ORDER — TRIAMCINOLONE ACETONIDE 40 MG/ML
40 INJECTION, SUSPENSION INTRA-ARTICULAR; INTRAMUSCULAR ONCE
Status: SHIPPED | OUTPATIENT
Start: 2019-01-29

## 2019-02-27 NOTE — TELEPHONE ENCOUNTER
No Protocol on this med.      Requested Prescriptions     Pending Prescriptions Disp Refills   • valACYclovir HCl 1 G Oral Tab 4 tablet 5     Si TABS AT ONE TIME THEN REPEAT 2 TABS 12 HOURS LATER       Last Office Visit with PCP: 2019  Last Blood P

## 2019-02-28 RX ORDER — VALACYCLOVIR HYDROCHLORIDE 1 G/1
TABLET, FILM COATED ORAL
Qty: 4 TABLET | Refills: 5 | Status: SHIPPED | OUTPATIENT
Start: 2019-02-28 | End: 2019-05-24

## 2019-02-28 RX ORDER — VALACYCLOVIR HYDROCHLORIDE 1 G/1
TABLET, FILM COATED ORAL
Qty: 4 TABLET | Refills: 5 | OUTPATIENT
Start: 2019-02-28

## 2019-03-01 NOTE — TELEPHONE ENCOUNTER
Duplicate request.       Requested Prescriptions     Pending Prescriptions Disp Refills   • valACYclovir HCl 1 G Oral Tab 4 tablet 5     Si TABS AT ONE TIME THEN REPEAT 2 TABS 12 HOURS LATER

## 2019-04-09 ENCOUNTER — OFFICE VISIT (OUTPATIENT)
Dept: FAMILY MEDICINE CLINIC | Facility: CLINIC | Age: 34
End: 2019-04-09
Payer: COMMERCIAL

## 2019-04-09 VITALS
WEIGHT: 190 LBS | DIASTOLIC BLOOD PRESSURE: 80 MMHG | BODY MASS INDEX: 28.79 KG/M2 | HEIGHT: 68 IN | HEART RATE: 74 BPM | SYSTOLIC BLOOD PRESSURE: 121 MMHG

## 2019-04-09 DIAGNOSIS — Z91.09 ENVIRONMENTAL ALLERGIES: Primary | ICD-10-CM

## 2019-04-09 PROCEDURE — 99213 OFFICE O/P EST LOW 20 MIN: CPT | Performed by: FAMILY MEDICINE

## 2019-04-09 PROCEDURE — 99212 OFFICE O/P EST SF 10 MIN: CPT | Performed by: FAMILY MEDICINE

## 2019-04-09 RX ORDER — FLUTICASONE PROPIONATE 50 MCG
2 SPRAY, SUSPENSION (ML) NASAL DAILY
Qty: 1 BOTTLE | Refills: 3 | Status: SHIPPED | OUTPATIENT
Start: 2019-04-09 | End: 2019-08-07

## 2019-04-09 RX ORDER — PROMETHAZINE HYDROCHLORIDE AND CODEINE PHOSPHATE 6.25; 1 MG/5ML; MG/5ML
5 SYRUP ORAL EVERY 6 HOURS PRN
Qty: 180 ML | Refills: 0 | Status: SHIPPED | OUTPATIENT
Start: 2019-04-09 | End: 2021-03-28 | Stop reason: ALTCHOICE

## 2019-04-09 RX ORDER — LEVOCETIRIZINE DIHYDROCHLORIDE 5 MG/1
5 TABLET, FILM COATED ORAL EVERY EVENING
Qty: 30 TABLET | Refills: 0 | Status: SHIPPED | OUTPATIENT
Start: 2019-04-09

## 2019-04-09 NOTE — PROGRESS NOTES
Blood pressure 121/80, pulse 74, height 5' 8\" (1.727 m), weight 190 lb (86.2 kg).     3-week history of a cough yellow to clear nasal congestion cough is nocturnal occasional sneezing occasional watery itchy eyes some environmental allergies he has 2 dogs

## 2019-05-25 RX ORDER — VALACYCLOVIR HYDROCHLORIDE 1 G/1
TABLET, FILM COATED ORAL
Qty: 4 TABLET | Refills: 5 | Status: SHIPPED | OUTPATIENT
Start: 2019-05-25 | End: 2019-07-20

## 2019-05-25 NOTE — TELEPHONE ENCOUNTER
Review pended refill request as it does not fall under a protocol.     Last Rx: 19  LOV: 19    Requested Prescriptions     Pending Prescriptions Disp Refills   • valACYclovir HCl 1 G Oral Tab 4 tablet 5     Si TABS AT ONE TIME THEN REPEAT 2 TAB

## 2019-06-20 NOTE — TELEPHONE ENCOUNTER
From: Marysol Torres  To: Gwyn Marin DO  Sent: 9/26/2017 6:24 PM CDT  Subject: Test Results Question    I have a question about SERUM PROTEIN ELECTROPHORESIS resulted on 9/26/17, 1:18 PM.    Just wondering if everything is ok with the results o Returned call to mom to discuss Dr. Paul's message, mom agrees to referral.  Mom verbalizes thanks and understanding.    Referral placed.

## 2019-07-20 NOTE — TELEPHONE ENCOUNTER
Review pended refill request as it does not fall under a protocol.   Requested Prescriptions     Pending Prescriptions Disp Refills   • valACYclovir HCl 1 G Oral Tab 4 tablet 5     Si TABS AT ONE TIME THEN REPEAT 2 TABS 12 HOURS LATER         Recent Vis

## 2019-07-25 RX ORDER — VALACYCLOVIR HYDROCHLORIDE 1 G/1
TABLET, FILM COATED ORAL
Qty: 4 TABLET | Refills: 5 | Status: SHIPPED | OUTPATIENT
Start: 2019-07-25 | End: 2019-12-03

## 2019-08-17 ENCOUNTER — OFFICE VISIT (OUTPATIENT)
Dept: FAMILY MEDICINE CLINIC | Facility: CLINIC | Age: 34
End: 2019-08-17
Payer: COMMERCIAL

## 2019-08-17 VITALS
WEIGHT: 188 LBS | HEIGHT: 68 IN | BODY MASS INDEX: 28.49 KG/M2 | SYSTOLIC BLOOD PRESSURE: 137 MMHG | HEART RATE: 72 BPM | DIASTOLIC BLOOD PRESSURE: 89 MMHG

## 2019-08-17 DIAGNOSIS — M67.911 DISORDER OF RIGHT ROTATOR CUFF: ICD-10-CM

## 2019-08-17 DIAGNOSIS — M19.011 OSTEOARTHRITIS, LOCALIZED, SHOULDER, RIGHT: Primary | ICD-10-CM

## 2019-08-17 RX ORDER — TRIAMCINOLONE ACETONIDE 40 MG/ML
40 INJECTION, SUSPENSION INTRA-ARTICULAR; INTRAMUSCULAR ONCE
Status: SHIPPED | OUTPATIENT
Start: 2019-08-17

## 2019-08-17 NOTE — PROGRESS NOTES
Patient presents with right shoulder pain. Had an injection 8 months ago was very good for probably 7 months however now he has again the pain with elevation of the right shoulder. Patient has a little bit of weakness in that as well.   X-rays are reviewe

## 2019-12-05 RX ORDER — VALACYCLOVIR HYDROCHLORIDE 1 G/1
TABLET, FILM COATED ORAL
Qty: 4 TABLET | Refills: 5 | Status: SHIPPED | OUTPATIENT
Start: 2019-12-05 | End: 2020-07-02

## 2019-12-08 ENCOUNTER — HOSPITAL ENCOUNTER (OUTPATIENT)
Age: 34
Discharge: HOME | End: 2019-12-08
Payer: COMMERCIAL

## 2019-12-08 PROCEDURE — 90471 IMMUNIZATION ADMIN: CPT

## 2019-12-08 PROCEDURE — 90686 IIV4 VACC NO PRSV 0.5 ML IM: CPT

## 2020-01-21 RX ORDER — VALACYCLOVIR HYDROCHLORIDE 1 G/1
TABLET, FILM COATED ORAL
Qty: 4 TABLET | Refills: 5 | OUTPATIENT
Start: 2020-01-21

## 2020-01-22 NOTE — TELEPHONE ENCOUNTER
Duplicate request, previously addressed.     Requested Prescriptions     Pending Prescriptions Disp Refills   • valACYclovir HCl 1 G Oral Tab 4 tablet 5     Si TABS AT ONE TIME THEN REPEAT 2 TABS 12 HOURS LATER

## 2020-07-02 ENCOUNTER — TELEPHONE (OUTPATIENT)
Dept: FAMILY MEDICINE CLINIC | Facility: CLINIC | Age: 35
End: 2020-07-02

## 2020-07-02 ENCOUNTER — PATIENT MESSAGE (OUTPATIENT)
Dept: FAMILY MEDICINE CLINIC | Facility: CLINIC | Age: 35
End: 2020-07-02

## 2020-07-02 ENCOUNTER — OFFICE VISIT (OUTPATIENT)
Dept: FAMILY MEDICINE CLINIC | Facility: CLINIC | Age: 35
End: 2020-07-02
Payer: COMMERCIAL

## 2020-07-02 VITALS
SYSTOLIC BLOOD PRESSURE: 119 MMHG | TEMPERATURE: 98 F | HEIGHT: 68 IN | BODY MASS INDEX: 28.64 KG/M2 | WEIGHT: 189 LBS | HEART RATE: 75 BPM | DIASTOLIC BLOOD PRESSURE: 73 MMHG

## 2020-07-02 DIAGNOSIS — B00.1 HERPES LABIALIS: Primary | ICD-10-CM

## 2020-07-02 PROCEDURE — 99213 OFFICE O/P EST LOW 20 MIN: CPT | Performed by: FAMILY MEDICINE

## 2020-07-02 RX ORDER — VALACYCLOVIR HYDROCHLORIDE 1 G/1
TABLET, FILM COATED ORAL
Qty: 8 TABLET | Refills: 5 | Status: SHIPPED | OUTPATIENT
Start: 2020-07-02 | End: 2021-05-17

## 2020-07-02 NOTE — TELEPHONE ENCOUNTER
Suman message sent. RN=call and triage      ----- Message from [facility].  Chaz Barrios sent at 7/2/2020 12:37 PM CDT -----  Regarding: Prescription Question  Contact: [phone]  Hello I need to know if I need to come in to see a doctor again to be able to

## 2020-07-02 NOTE — TELEPHONE ENCOUNTER
See telephone encounter acute 7/2/20. From: Jamie Loya  To: Reyes Lyon.  DO Sobeida  Sent: 7/2/2020 12:37 PM CDT  Subject: Prescription Question    Hello I need to know if I need to come in to see a doctor again to be able to get a refill for my

## 2020-07-02 NOTE — PROGRESS NOTES
Patient presents with:  Mouth Cold Sores  Establish Care    HPI:   Rogenia Hashimoto is a 37year old male who presents to clinic with complaints of cold sores. In prodromal phase right now and can feel tingling, no eruption yet.   Patient states he gets f

## 2021-01-22 ENCOUNTER — OFFICE VISIT (OUTPATIENT)
Dept: FAMILY MEDICINE CLINIC | Facility: CLINIC | Age: 36
End: 2021-01-22
Payer: COMMERCIAL

## 2021-01-22 VITALS
BODY MASS INDEX: 30.31 KG/M2 | SYSTOLIC BLOOD PRESSURE: 123 MMHG | HEART RATE: 87 BPM | WEIGHT: 200 LBS | HEIGHT: 68 IN | TEMPERATURE: 98 F | DIASTOLIC BLOOD PRESSURE: 71 MMHG

## 2021-01-22 DIAGNOSIS — M25.511 CHRONIC RIGHT SHOULDER PAIN: Primary | ICD-10-CM

## 2021-01-22 DIAGNOSIS — G89.29 CHRONIC RIGHT SHOULDER PAIN: Primary | ICD-10-CM

## 2021-01-22 PROCEDURE — 99213 OFFICE O/P EST LOW 20 MIN: CPT | Performed by: FAMILY MEDICINE

## 2021-01-22 PROCEDURE — 3078F DIAST BP <80 MM HG: CPT | Performed by: FAMILY MEDICINE

## 2021-01-22 PROCEDURE — 3008F BODY MASS INDEX DOCD: CPT | Performed by: FAMILY MEDICINE

## 2021-01-22 PROCEDURE — 3074F SYST BP LT 130 MM HG: CPT | Performed by: FAMILY MEDICINE

## 2021-01-22 NOTE — PROGRESS NOTES
Patient presents with: Follow - Up: right shoulder pain and limited ROM    HPI:   Lashon Tafoya is a 38year old male who presents to clinic with complaints of right anterior shoulder pain flareup.   Patient had x-ray done which showed minimal osteoart

## 2021-03-28 ENCOUNTER — HOSPITAL ENCOUNTER (OUTPATIENT)
Age: 36
Discharge: HOME | End: 2021-03-28
Attending: PHYSICIAN ASSISTANT
Payer: COMMERCIAL

## 2021-03-28 VITALS
TEMPERATURE: 98 F | RESPIRATION RATE: 18 BRPM | SYSTOLIC BLOOD PRESSURE: 149 MMHG | DIASTOLIC BLOOD PRESSURE: 93 MMHG | OXYGEN SATURATION: 100 % | HEART RATE: 77 BPM

## 2021-03-28 DIAGNOSIS — R03.0 ELEVATED BLOOD PRESSURE READING: ICD-10-CM

## 2021-03-28 DIAGNOSIS — H60.501 ACUTE OTITIS EXTERNA OF RIGHT EAR, UNSPECIFIED TYPE: ICD-10-CM

## 2021-03-28 DIAGNOSIS — H61.21 IMPACTED CERUMEN OF RIGHT EAR: Primary | ICD-10-CM

## 2021-03-28 PROCEDURE — 99213 OFFICE O/P EST LOW 20 MIN: CPT

## 2021-03-28 PROCEDURE — 69210 REMOVE IMPACTED EAR WAX UNI: CPT

## 2021-03-28 PROCEDURE — 69209 REMOVE IMPACTED EAR WAX UNI: CPT

## 2021-03-28 RX ORDER — NEOMYCIN SULFATE, POLYMYXIN B SULFATE AND HYDROCORTISONE 10; 3.5; 1 MG/ML; MG/ML; [USP'U]/ML
4 SUSPENSION/ DROPS AURICULAR (OTIC) 3 TIMES DAILY
Qty: 1 BOTTLE | Refills: 0 | Status: SHIPPED | OUTPATIENT
Start: 2021-03-28 | End: 2021-04-04

## 2021-03-28 NOTE — ED PROVIDER NOTES
Patient Seen in: [facility]    History   Patient presents with:  Ear Pain    Stated Complaint: ear problem    HPI    66-year-old male presents with chief complaint of right earache. Onset yesterday.   Patient states he had been experiencing m Hypertension Maternal Grandfather    • Heart Disease Maternal Grandfather    • Cancer Maternal Grandfather 67        Cancer-prostate   • Heart Attack Paternal Grandmother    • Heart Disease Paternal Grandmother    • Diabetes Paternal Aunt    • Crohn's Dise otorrhea. Neck: The neck is supple. There is no evidence of JVD. No meningeal signs. Chest: There is no tenderness to the chest wall. No CVA tenderness bilaterally. Respiratory: Respiratory effort was normal.  There is no rales, wheezes, or rhonchi. their blood pressure re-checked within 24-48 hours was provided.     Disposition and Plan     Clinical Impression:  Impacted cerumen of right ear  (primary encounter diagnosis)  Acute otitis externa of right ear, unspecified type  Elevated blood pressure re

## 2021-03-28 NOTE — ED INITIAL ASSESSMENT (HPI)
PATIENT ARRIVED AMBULATORY TO ROOM C/O RIGHT EAR ISSUES X1 WEEK. PATIENT STATES PAIN STARTED YESTERDAY. +MUFFLED HEARING/\"CLOGGED FEELING\" X1 WEEK. NO NASAL CONGESTION. NO COUGH. EASY NON LABORED RESPIRATIONS.

## 2021-04-06 ENCOUNTER — OFFICE VISIT (OUTPATIENT)
Dept: FAMILY MEDICINE CLINIC | Facility: CLINIC | Age: 36
End: 2021-04-06
Payer: COMMERCIAL

## 2021-04-06 VITALS
SYSTOLIC BLOOD PRESSURE: 119 MMHG | BODY MASS INDEX: 30.62 KG/M2 | HEART RATE: 61 BPM | DIASTOLIC BLOOD PRESSURE: 78 MMHG | HEIGHT: 68 IN | WEIGHT: 202 LBS | TEMPERATURE: 97 F

## 2021-04-06 DIAGNOSIS — H61.21 RIGHT EAR IMPACTED CERUMEN: Primary | ICD-10-CM

## 2021-04-06 PROCEDURE — 99213 OFFICE O/P EST LOW 20 MIN: CPT | Performed by: FAMILY MEDICINE

## 2021-04-06 PROCEDURE — 3074F SYST BP LT 130 MM HG: CPT | Performed by: FAMILY MEDICINE

## 2021-04-06 PROCEDURE — 3078F DIAST BP <80 MM HG: CPT | Performed by: FAMILY MEDICINE

## 2021-04-06 PROCEDURE — 3008F BODY MASS INDEX DOCD: CPT | Performed by: FAMILY MEDICINE

## 2021-04-06 NOTE — PROGRESS NOTES
Patient presents with:  Urgent Care F/u: seen on 3/28 @ [facility] for impacted right ear     HPI:   Marybeth Rudolph is a 38year old male who presents to clinic with complaints of muffled hearing, ear ringing in his right ear.   Went to urgent care on 3/28 a

## 2021-04-17 ENCOUNTER — IMMUNIZATION (OUTPATIENT)
Dept: LAB | Facility: HOSPITAL | Age: 36
End: 2021-04-17
Attending: EMERGENCY MEDICINE
Payer: COMMERCIAL

## 2021-04-17 DIAGNOSIS — Z23 NEED FOR VACCINATION: Primary | ICD-10-CM

## 2021-04-17 PROCEDURE — 0011A SARSCOV2 VAC 100MCG/0.5ML IM: CPT

## 2021-05-15 ENCOUNTER — IMMUNIZATION (OUTPATIENT)
Dept: LAB | Facility: HOSPITAL | Age: 36
End: 2021-05-15
Attending: EMERGENCY MEDICINE
Payer: COMMERCIAL

## 2021-05-15 DIAGNOSIS — Z23 NEED FOR VACCINATION: Primary | ICD-10-CM

## 2021-05-15 PROCEDURE — 0012A SARSCOV2 VAC 100MCG/0.5ML IM: CPT

## 2021-05-17 DIAGNOSIS — B00.1 HERPES LABIALIS: ICD-10-CM

## 2021-05-18 RX ORDER — VALACYCLOVIR HYDROCHLORIDE 1 G/1
TABLET, FILM COATED ORAL
Qty: 8 TABLET | Refills: 5 | Status: SHIPPED | OUTPATIENT
Start: 2021-05-18 | End: 2021-09-18

## 2021-09-18 DIAGNOSIS — B00.1 HERPES LABIALIS: ICD-10-CM

## 2021-09-20 RX ORDER — VALACYCLOVIR HYDROCHLORIDE 1 G/1
TABLET, FILM COATED ORAL
Qty: 8 TABLET | Refills: 5 | Status: SHIPPED | OUTPATIENT
Start: 2021-09-20

## 2021-10-04 ENCOUNTER — PATIENT MESSAGE (OUTPATIENT)
Dept: FAMILY MEDICINE CLINIC | Facility: CLINIC | Age: 36
End: 2021-10-04

## 2021-10-04 RX ORDER — PREDNISONE 20 MG/1
40 TABLET ORAL DAILY
Qty: 10 TABLET | Refills: 0 | Status: SHIPPED | OUTPATIENT
Start: 2021-10-04 | End: 2021-10-09

## 2021-10-04 NOTE — TELEPHONE ENCOUNTER
From: Lamonte Pearson  To: Sue Bass MD  Sent: 10/4/2021 8:03 AM CDT  Subject: Right shoulder pain    Hello good morning I just have a question about my shoulder.  I know you seen me quite a few months ago and I think you told me to see a specialis

## 2021-10-23 ENCOUNTER — TELEPHONE (OUTPATIENT)
Dept: FAMILY MEDICINE CLINIC | Facility: CLINIC | Age: 36
End: 2021-10-23

## 2021-10-23 DIAGNOSIS — G89.29 CHRONIC RIGHT SHOULDER PAIN: Primary | ICD-10-CM

## 2021-10-23 DIAGNOSIS — M25.511 CHRONIC RIGHT SHOULDER PAIN: Primary | ICD-10-CM

## 2021-10-23 NOTE — TELEPHONE ENCOUNTER
----- Message from [facility]. Dio Daniella sent at 10/23/2021  7:57 AM CDT -----  Regarding: Right shoulder pain  Hello good morning, just an update on shoulder it’s back to really hurting again.  Steroids seem to help a little but I do think I will look into se

## 2021-12-01 ENCOUNTER — OFFICE VISIT (OUTPATIENT)
Dept: ORTHOPEDICS CLINIC | Facility: CLINIC | Age: 36
End: 2021-12-01
Payer: COMMERCIAL

## 2021-12-01 ENCOUNTER — HOSPITAL ENCOUNTER (OUTPATIENT)
Dept: GENERAL RADIOLOGY | Facility: HOSPITAL | Age: 36
Discharge: HOME | End: 2021-12-01
Attending: ORTHOPAEDIC SURGERY
Payer: COMMERCIAL

## 2021-12-01 DIAGNOSIS — M25.511 RIGHT SHOULDER PAIN, UNSPECIFIED CHRONICITY: Primary | ICD-10-CM

## 2021-12-01 DIAGNOSIS — M25.811 OS ACROMIALE OF RIGHT SHOULDER: ICD-10-CM

## 2021-12-01 DIAGNOSIS — M25.511 RIGHT SHOULDER PAIN, UNSPECIFIED CHRONICITY: ICD-10-CM

## 2021-12-01 PROCEDURE — 73030 X-RAY EXAM OF SHOULDER: CPT | Performed by: ORTHOPAEDIC SURGERY

## 2021-12-01 PROCEDURE — 99243 OFF/OP CNSLTJ NEW/EST LOW 30: CPT | Performed by: ORTHOPAEDIC SURGERY

## 2021-12-01 NOTE — PROGRESS NOTES
NURSING INTAKE COMMENTS: Patient presents with:  Consult: Right shoulder pain, denies injury, 6/10 pain scale      HPI: This 43year old male presents today with complaints of pain in the anterior aspect of his right shoulder.   He has a physically active j Never used    Substance and Sexual Activity      Alcohol use:  Yes        Alcohol/week: 8.0 standard drinks        Types: 1 Cans of beer, 7 Standard drinks or equivalent per week        Comment: occasional      Drug use: No      Sexual activity: Not on file and Nexium. We talked about the options for surgical treatment including attempted repair versus excision. He will follow-up as needed. The above note was creating using Dragon speech recognition technology. Please excuse any typos.     Shaan Esteban

## 2022-02-09 NOTE — TELEPHONE ENCOUNTER
Please review. Protocol failed / No protocol. Please advise if follow up is needed. ASSESSMENT AND PLAN:   1. Herpes labialis  - gave enough medication for 2 episodes of cold sores. - valACYclovir HCl 1 G Oral Tab; 2 TABS AT ONE TIME THEN REPEAT 2 TABS 12 HOURS LATER  Dispense: 8 tablet;  Refill: 5        David Celis MD  2020  4:17 PM          Requested Prescriptions   Pending Prescriptions Disp Refills    valACYclovir 1 G Oral Tab 8 tablet 5     Si TABS AT ONE TIME THEN REPEAT 2 TABS 12 HOURS LATER        There is no refill protocol information for this order             Recent Outpatient Visits              2 months ago Right shoulder pain, unspecified chronicity    [facility] for Cipriano Huerta MD    Office Visit    10 months ago Right ear impacted [facility], [address], Warner Dai MD    Office Visit    1 year ago Chronic right shoulder pain    Warner Burdick MD    Office Visit    1 year ago Herpes labialis    Warner Burdick MD    Office Visit    2 years ago Osteoarthritis, localized, shoulder, right    [facility], [facility], [address], [facility], [address] [place] [address] Visit

## 2022-02-10 RX ORDER — VALACYCLOVIR HYDROCHLORIDE 1 G/1
TABLET, FILM COATED ORAL
Qty: 8 TABLET | Refills: 5 | Status: SHIPPED | OUTPATIENT
Start: 2022-02-10

## 2022-11-04 DIAGNOSIS — B00.1 HERPES LABIALIS: ICD-10-CM

## 2022-11-05 RX ORDER — VALACYCLOVIR HYDROCHLORIDE 1 G/1
TABLET, FILM COATED ORAL
Qty: 8 TABLET | Refills: 5 | Status: SHIPPED | OUTPATIENT
Start: 2022-11-05

## 2023-05-22 DIAGNOSIS — B00.1 HERPES LABIALIS: ICD-10-CM

## 2023-05-24 NOTE — TELEPHONE ENCOUNTER
Please review refill protocol failed/ no protocol  Requested Prescriptions   Pending Prescriptions Disp Refills    valACYclovir 1 G Oral Tab 8 tablet 5     Si TABS AT ONE TIME THEN REPEAT 2 TABS 12 HOURS LATER       There is no refill protocol information for this order

## 2023-05-25 RX ORDER — VALACYCLOVIR HYDROCHLORIDE 1 G/1
TABLET, FILM COATED ORAL
Qty: 8 TABLET | Refills: 5 | Status: SHIPPED | OUTPATIENT
Start: 2023-05-25

## 2023-10-27 NOTE — PROGRESS NOTES
Patient ID: Jessica Fang is a 33year old male.     HPI  Patient presents with:  Cough: with green phelgm X 5 days  Sore Throat: pt c/o dry throat X 5 days  Nasal Congestion: X 5 days    For about 5 days now he has had a cough, sore throat, nasal zachary well-nourished. No distress. HENT:   Head: Normocephalic. Right Ear: Tympanic membrane, external ear and ear canal normal.   Left Ear: Tympanic membrane, external ear and ear canal normal.   Nose: Mucosal edema and rhinorrhea present.    Nose: mild zachary <-- Click to add NO significant Past Surgical History

## 2023-11-16 DIAGNOSIS — B00.1 HERPES LABIALIS: ICD-10-CM

## 2023-12-15 RX ORDER — VALACYCLOVIR HYDROCHLORIDE 1 G/1
TABLET, FILM COATED ORAL
Qty: 8 TABLET | Refills: 5 | OUTPATIENT
Start: 2023-12-15

## 2024-01-11 DIAGNOSIS — B00.1 HERPES LABIALIS: ICD-10-CM

## 2024-01-11 RX ORDER — VALACYCLOVIR HYDROCHLORIDE 1 G/1
TABLET, FILM COATED ORAL
Qty: 8 TABLET | Refills: 5 | OUTPATIENT
Start: 2024-01-11

## 2024-05-16 ENCOUNTER — OFFICE VISIT (OUTPATIENT)
Dept: FAMILY MEDICINE CLINIC | Facility: CLINIC | Age: 39
End: 2024-05-16

## 2024-05-16 VITALS
BODY MASS INDEX: 31 KG/M2 | WEIGHT: 206 LBS | HEART RATE: 86 BPM | SYSTOLIC BLOOD PRESSURE: 135 MMHG | DIASTOLIC BLOOD PRESSURE: 88 MMHG

## 2024-05-16 DIAGNOSIS — Z00.00 ENCOUNTER FOR ANNUAL HEALTH EXAMINATION: Primary | ICD-10-CM

## 2024-05-16 DIAGNOSIS — B00.1 HERPES LABIALIS: ICD-10-CM

## 2024-05-16 DIAGNOSIS — R03.0 ELEVATED BLOOD PRESSURE READING: ICD-10-CM

## 2024-05-16 PROCEDURE — 99396 PREV VISIT EST AGE 40-64: CPT | Performed by: FAMILY MEDICINE

## 2024-05-16 PROCEDURE — 3079F DIAST BP 80-89 MM HG: CPT | Performed by: FAMILY MEDICINE

## 2024-05-16 PROCEDURE — 3075F SYST BP GE 130 - 139MM HG: CPT | Performed by: FAMILY MEDICINE

## 2024-05-16 RX ORDER — VALACYCLOVIR HYDROCHLORIDE 1 G/1
TABLET, FILM COATED ORAL
Qty: 8 TABLET | Refills: 5 | Status: SHIPPED | OUTPATIENT
Start: 2024-05-16

## 2024-05-16 NOTE — PROGRESS NOTES
Alek Desir is a 38 year old male who presents for a complete physical exam.   HPI:       Wt Readings from Last 3 Encounters:   24 206 lb (93.4 kg)   21 202 lb (91.6 kg)   21 200 lb (90.7 kg)     Body mass index is 31.32 kg/m².     Current Outpatient Medications   Medication Sig Dispense Refill    valACYclovir 1 G Oral Tab 2 TABS AT ONE TIME THEN REPEAT 2 TABS 12 HOURS LATER 8 tablet 5    Levocetirizine Dihydrochloride 5 MG Oral Tab Take 1 tablet (5 mg total) by mouth every evening. (Patient not taking: Reported on 2020 ) 30 tablet 0      Past Medical History:    Back injury    bulging disc    Constipation    Decorative tattoo      Past Surgical History:   Procedure Laterality Date    Back surgery  16    Lumber fusion L5-S1    Colonoscopy N/A 2017    Procedure: COLONOSCOPY;  Surgeon: Sumit Davies MD;  Location: [facility] ENDOSCOPY    Excis lumbar disk,one level      Spinal fusion      L5+S1    Spine surgery procedure unlisted  16    L5 S1 FUSION    Tonsillectomy        Family History   Problem Relation Age of Onset    Hypertension Father     Diabetes Mother     Cancer Maternal Grandmother 60        Cancer-Unknown    Hypertension Maternal Grandfather     Heart Disease Maternal Grandfather     Cancer Maternal Grandfather 72        Cancer-prostate    Heart Attack Paternal Grandmother     Heart Disease Paternal Grandmother     Diabetes Paternal Aunt     Crohn's Disease Paternal Aunt     Diabetes Maternal Aunt       Social History:  Social History     Socioeconomic History    Marital status:    Tobacco Use    Smoking status: Former     Current packs/day: 0.00     Types: Cigarettes     Quit date: 2012     Years since quittin.0     Passive exposure: Never    Smokeless tobacco: Former   Vaping Use    Vaping status: Never Used   Substance and Sexual Activity    Alcohol use: Yes     Alcohol/week: 8.0 standard drinks of alcohol     Types: 1 Cans of beer, 7 Standard  drinks or equivalent per week     Comment: occasional    Drug use: No           REVIEW OF SYSTEMS:   Negative, except per HPI.     EXAM:   BP (!) 145/113 (BP Location: Left arm, Patient Position: Sitting, Cuff Size: adult)   Pulse 86   Wt 206 lb (93.4 kg)   BMI 31.32 kg/m²     GENERAL: well developed, well nourished, in no apparent distress  SKIN: no rashes, no suspicious lesions  HEENT: atraumatic, normocephalic, ears are clear  EYES: PERRLA, EOMI, conjunctiva are clear  NECK: supple, no adenopathy  LUNGS: clear to auscultation  CARDIO: RRR without murmur  GI: good BS, no masses, HSM or tenderness  MUSCULOSKELETAL: back is not tender, FROM of the back  EXTREMITIES: no cyanosis, clubbing or edema  NEURO: Oriented times three, cranial nerves are intact, motor and sensory are grossly intact    ASSESSMENT AND PLAN:   Alek Desir is a 38 year old male who presents for a complete physical exam.    1. Encounter for annual health examination  -Medical, surgical and social history, as well as medications and allergies were reviewed with patient.  - CBC With Differential With Platelet  - Comp Metabolic Panel (14)  - Hemoglobin A1C  - Lipid Panel  - TSH W Reflex To Free T4    2. Herpes labialis  - valACYclovir 1 G Oral Tab; 2 TABS AT ONE TIME THEN REPEAT 2 TABS 12 HOURS LATER  Dispense: 8 tablet; Refill: 5    3. Elevated blood pressure reading  See AVS.  Advised patient to check blood pressure at home and follow-up if itstays elevated.    RTC if no improvement in symptoms. Red flags discussed to go to ER.      Ashley Bustamante MD  5/16/2024  5:19 PM

## 2024-05-16 NOTE — PATIENT INSTRUCTIONS
Please monitor your blood pressure at home.  If your blood pressures consistently above 140/' above 90 please send us a [employer] message or call to schedule an appointment so that we can provide blood pressure medication.

## 2024-05-28 DIAGNOSIS — B00.1 HERPES LABIALIS: ICD-10-CM

## 2024-05-28 RX ORDER — VALACYCLOVIR HYDROCHLORIDE 1 G/1
TABLET, FILM COATED ORAL
Qty: 8 TABLET | Refills: 5 | OUTPATIENT
Start: 2024-05-28

## 2024-06-03 ENCOUNTER — PATIENT MESSAGE (OUTPATIENT)
Dept: FAMILY MEDICINE CLINIC | Facility: CLINIC | Age: 39
End: 2024-06-03

## 2024-06-03 NOTE — TELEPHONE ENCOUNTER
From: Alek Desir  To: Ashley Bustamante  Sent: 6/3/2024 2:55 PM CDT  Subject: Bloodwork that needs done.    Hello quick question for you for that bloodwork that I need to get done. Do I need to schedule an appointment to come in?

## 2024-11-26 ENCOUNTER — NURSE TRIAGE (OUTPATIENT)
Dept: INTERNAL MEDICINE CLINIC | Facility: CLINIC | Age: 39
End: 2024-11-26

## 2024-11-26 NOTE — TELEPHONE ENCOUNTER
Action Requested: Summary for Provider     []  Critical Lab, Recommendations Needed  [] Need Additional Advice  []   FYI    []   Need Orders  [] Need Medications Sent to Pharmacy  []  Other     SUMMARY: Per protocol, patient should be seen in the office today. Patient was supposed to be seen tomorrow but unable to make it. Requesting to reschedule appointment. Appointment rescheduled.    Future Appointments   Date Time Provider Department Center   11/29/2024 10:00 AM Nguyen, Minhxuyen, PA-C [facility] [place]      Reason for call: Headache (/)  Onset: Couple Of Weeks Ago    Patient reports of intermittent headaches for a couple of weeks. States headache is anywhere from the side, middle and front of the head. It is mild now most of the time, worse when it started. Denies vision changes or other neurologic symptoms. Admits to tense shoulder and right side of the neck. Patient attributes this to getting a new position at work being the . 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 He attributes the headache to stress. He has been taking ibuprofen or Advil with some relief. Gets massages from spouse which somehow works.    Also noticed blood in the stool a couple of days ago. States toilet water turned red and this was the worst he has seen thus far. He has been noticing occasional blood in the stool for the past year. He is constipated once in a while and was a few days ago. Denies dizziness or lightheadedness. No other gastrointestinal symptoms. Recalls doing capsule endoscopy in the past but was told that they did not see anything. Care advice given per protocol. Patient verbalized understanding and agreed with plan of care.     Reason for Disposition   Patient wants to be seen    Protocols used: Headache-A-[place]

## 2024-11-29 ENCOUNTER — OFFICE VISIT (OUTPATIENT)
Dept: FAMILY MEDICINE CLINIC | Facility: CLINIC | Age: 39
End: 2024-11-29

## 2024-11-29 VITALS
HEART RATE: 81 BPM | HEIGHT: 65 IN | SYSTOLIC BLOOD PRESSURE: 137 MMHG | DIASTOLIC BLOOD PRESSURE: 88 MMHG | WEIGHT: 201 LBS | BODY MASS INDEX: 33.49 KG/M2

## 2024-11-29 DIAGNOSIS — K92.1 BLOOD IN STOOL: ICD-10-CM

## 2024-11-29 DIAGNOSIS — S16.1XXA ACUTE STRAIN OF NECK MUSCLE, INITIAL ENCOUNTER: Primary | ICD-10-CM

## 2024-11-29 PROCEDURE — 3075F SYST BP GE 130 - 139MM HG: CPT | Performed by: PHYSICIAN ASSISTANT

## 2024-11-29 PROCEDURE — 99213 OFFICE O/P EST LOW 20 MIN: CPT | Performed by: PHYSICIAN ASSISTANT

## 2024-11-29 PROCEDURE — 3008F BODY MASS INDEX DOCD: CPT | Performed by: PHYSICIAN ASSISTANT

## 2024-11-29 PROCEDURE — 3079F DIAST BP 80-89 MM HG: CPT | Performed by: PHYSICIAN ASSISTANT

## 2024-11-29 NOTE — PROGRESS NOTES
HPI:     HPI  A 39-year-old male presents with neck and upper back pain that has persisted for the past 2.5 weeks. He noticed blood in his stool four days ago, both in the toilet and when wiping.     The patient denies experiencing chest pain, shortness of breath, nausea, vomiting, diarrhea, fever, dizziness, syncope, or abdominal pain. He has no other concerns today.        Medications:     Current Outpatient Medications   Medication Sig Dispense Refill    valACYclovir 1 G Oral Tab 2 TABS AT ONE TIME THEN REPEAT 2 TABS 12 HOURS LATER 8 tablet 5       Allergies:   Allergies[1]    History:     Health Maintenance   Topic Date Due    Annual Depression Screening  01/01/2024    COVID-19 Vaccine (3 - 2024-25 season) 09/01/2024    Influenza Vaccine (1) 10/01/2024    Annual Physical  05/16/2025    DTaP,Tdap,and Td Vaccines (2 - Td or Tdap) 11/09/2025    Pneumococcal Vaccine: Birth to 64yrs  Aged Out       No LMP for male patient.   Past Medical History:     Past Medical History:    Back injury    bulging disc    Constipation    Decorative tattoo       Past Surgical History:     Past Surgical History:   Procedure Laterality Date    Back surgery  09/21/16    Lumber fusion L5-S1    Colonoscopy N/A 12/13/2017    Procedure: COLONOSCOPY;  Surgeon: Sumit Davies MD;  Location: [facility] ENDOSCOPY    Excis lumbar disk,one level      Spinal fusion      L5+S1    Spine surgery procedure unlisted  09/21/16    L5 S1 FUSION    Tonsillectomy         Family History:     Family History   Problem Relation Age of Onset    Hypertension Father     Diabetes Mother     Cancer Maternal Grandmother 60        Cancer-Unknown    Hypertension Maternal Grandfather     Heart Disease Maternal Grandfather     Cancer Maternal Grandfather 72        Cancer-prostate    Heart Attack Paternal Grandmother     Heart Disease Paternal Grandmother     Diabetes Paternal Aunt     Crohn's Disease Paternal Aunt     Diabetes Maternal Aunt        Social History:     Social  History     Socioeconomic History    Marital status:      Spouse name: Not on file    Number of children: Not on file    Years of education: Not on file    Highest education level: Not on file   Occupational History    Not on file   Tobacco Use    Smoking status: Former     Current packs/day: 0.00     Types: Cigarettes     Quit date: 2012     Years since quittin.5     Passive exposure: Never    Smokeless tobacco: Former   Vaping Use    Vaping status: Never Used   Substance and Sexual Activity    Alcohol use: Yes     Alcohol/week: 8.0 standard drinks of alcohol     Types: 1 Cans of beer, 7 Standard drinks or equivalent per week     Comment: occasional    Drug use: No    Sexual activity: Not on file   Other Topics Concern    Not on file   Social History Narrative    Not on file     Social Drivers of Health     Financial Resource Strain: Not on file   Food Insecurity: Not on file   Transportation Needs: Not on file   Physical Activity: Not on file   Stress: Not on file   Social Connections: Not on file   Housing Stability: Not on file       Review of Systems:   Review of Systems   Musculoskeletal:  Positive for back pain and neck pain.        Vitals:    24 0950   BP: 137/88   Pulse: 81   Weight: 201 lb (91.2 kg)   Height: 5' 5\" (1.651 m)     Body mass index is 33.45 kg/m².    Physical Exam:   Physical Exam  Vitals reviewed.   Constitutional:       General: He is not in acute distress.     Appearance: He is well-developed.   Cardiovascular:      Rate and Rhythm: Normal rate and regular rhythm.      Heart sounds: Normal heart sounds, S1 normal and S2 normal. No murmur heard.  Pulmonary:      Effort: Pulmonary effort is normal.      Breath sounds: Normal breath sounds. No wheezing or rales.   Chest:      Chest wall: No tenderness.   Lymphadenopathy:      Cervical: No cervical adenopathy.   Skin:     Findings: No rash.   Neurological:      Mental Status: He is alert and oriented to person, place, and  time.   Psychiatric:         Behavior: Behavior is cooperative.      Inspection: Neck and spine have no noted deformities or signs of inflammation. Curvature of cervical, thoracic, and lumbar spine are within normal limits. Posture is upright, and gait is smooth and normal.  Palpation: Spinous processed of C7-L5 palpable, midline, and non- tender. Back muscles are tight, and tenderness between shoulder blade.  Flexion, extension, and side to side rotation of cervical spine causes no discomfort.        Assessment and Plan::     Problem List Items Addressed This Visit    None  Visit Diagnoses       Acute strain of neck muscle, initial encounter    -  Primary  Advise patient to take Tylenol or Ibuprofen as needed for pain.      Blood in stool      Advise the patient to increase fluid/fiber intake. Will observe at this time. Will refer to GI if symptom worsen or persists. His last Colonoscopy was in 2017.          Discussed plan of care with pt and pt is in agreement.All questions answered. Pt to call with questions or concerns.         [1]   Allergies  Allergen Reactions    Sulfa Antibiotics HIVES

## 2025-01-09 DIAGNOSIS — B00.1 HERPES LABIALIS: ICD-10-CM

## 2025-01-10 RX ORDER — VALACYCLOVIR HYDROCHLORIDE 1 G/1
2000 TABLET, FILM COATED ORAL EVERY 12 HOURS SCHEDULED
Qty: 8 TABLET | Refills: 5 | Status: SHIPPED | OUTPATIENT
Start: 2025-01-10

## 2025-01-10 NOTE — TELEPHONE ENCOUNTER
Refill passed per [facility] protocols.    Requested Prescriptions   Pending Prescriptions Disp Refills    valACYclovir 1 G Oral Tab 8 tablet 5     Si TABS AT ONE TIME THEN REPEAT 2 TABS 12 HOURS LATER       Herpes Agent Protocol Passed - 1/10/2025 10:16 AM        Passed - In person appointment or virtual visit in the past 12 mos or appointment in next 3 mos     Recent Outpatient Visits              1 month ago Acute strain of neck muscle, initial encounter    [facility] [place] Nguyen, Minhxuyen, PA-C    Office Visit    7 months ago Encounter for annual health examination    [facility], Ashley Davila MD    Office Visit    3 years ago Right shoulder pain, unspecified chronicity    [facility], Terry Schulte MD    Office Visit    3 years ago [facility] ear impacted cerumen    [facility]    [facility] Visit    3 years ago Chronic right shoulder pain    [facility], Ashley Davila MD    Office Visit                      Passed - Medication is active on med list

## 2025-02-08 ENCOUNTER — HOSPITAL ENCOUNTER (OUTPATIENT)
Age: 40
Discharge: HOME | End: 2025-02-08
Payer: COMMERCIAL

## 2025-02-08 VITALS
DIASTOLIC BLOOD PRESSURE: 81 MMHG | TEMPERATURE: 98 F | SYSTOLIC BLOOD PRESSURE: 136 MMHG | HEART RATE: 86 BPM | RESPIRATION RATE: 16 BRPM | OXYGEN SATURATION: 99 %

## 2025-02-08 DIAGNOSIS — H57.89 REDNESS OF LEFT EYE: Primary | ICD-10-CM

## 2025-02-08 PROCEDURE — 99213 OFFICE O/P EST LOW 20 MIN: CPT

## 2025-02-08 PROCEDURE — 99204 OFFICE O/P NEW MOD 45 MIN: CPT

## 2025-02-08 RX ORDER — ERYTHROMYCIN 5 MG/G
1 OINTMENT OPHTHALMIC
Qty: 1 G | Refills: 0 | Status: SHIPPED | OUTPATIENT
Start: 2025-02-08 | End: 2025-02-18

## 2025-02-08 NOTE — ED PROVIDER NOTES
Patient Seen in: [facility] [place]    History     Chief Complaint   Patient presents with    Head Neck Injury     eye inflammation or infection possible.  eye socket and eyeball pain swelling and redness. painful. - Entered by patient    Eye Problem     Stated Complaint: Head Neck Injury - eye inflammation or infection possible.  eye socket and eyeb*    HPI    39-year-old male presents with chief complaint of left eye redness.  Onset 1 month ago, worsening over the past week.  Patient applying warm compresses without relief of symptoms.  Patient reports associated clear ocular discharge.  Patient does not wear contact lenses.  Patient denies injury or trauma, fever, chills, vision changes, diplopia, photophobia, nausea, vomiting.    Past Medical History:    Back injury    bulging disc    Constipation    Decorative tattoo       Past Surgical History:   Procedure Laterality Date    Back surgery  16    Lumber fusion L5-S1    Colonoscopy N/A 2017    Procedure: COLONOSCOPY;  Surgeon: Sumit Davies MD;  Location: [facility] ENDOSCOPY    Excis lumbar disk,one level      Spinal fusion      L5+S1    Spine surgery procedure unlisted  16    L5 S1 FUSION    Tonsillectomy              Family History   Problem Relation Age of Onset    Hypertension Father     Diabetes Mother     Cancer Maternal Grandmother 60        Cancer-Unknown    Hypertension Maternal Grandfather     Heart Disease Maternal Grandfather     Cancer Maternal Grandfather 72        Cancer-prostate    Heart Attack Paternal Grandmother     Heart Disease Paternal Grandmother     Diabetes Paternal Aunt     Crohn's Disease Paternal Aunt     Diabetes Maternal Aunt        Social History     Socioeconomic History    Marital status:    Tobacco Use    Smoking status: Former     Current packs/day: 0.00     Types: Cigarettes     Quit date: 2012     Years since quittin.7     Passive exposure: Never    Smokeless tobacco: Former   Vaping Use     Vaping status: Never Used   Substance and Sexual Activity    Alcohol use: Yes     Alcohol/week: 8.0 standard drinks of alcohol     Types: 1 Cans of beer, 7 Standard drinks or equivalent per week     Comment: occasional    Drug use: No       Review of Systems    Positive for stated complaint: Head Neck Injury - eye inflammation or infection possible.  eye socket and eyeb*  Other systems are as noted in HPI.  Constitutional and vital signs reviewed.      All other systems reviewed and negative except as noted above.    PSFH elements reviewed from today and agreed except as otherwise stated in HPI.    Physical Exam     ED Triage Vitals [02/08/25 1437]   /81   Pulse 86   Resp 16   Temp 98.2 °F (36.8 °C)   Temp src Oral   SpO2 99 %   O2 Device None (Room air)       Current:/81   Pulse 86   Temp 98.2 °F (36.8 °C) (Oral)   Resp 16   SpO2 99%     PULSE OX within normal limits on room air as interpreted by this provider.    Constitutional: The patient is cooperative. Appears well-developed and well-nourished.  Mild discomfort.  Psychological: Alert, No abnormalities of mood, affect.  Head: Normocephalic/atraumatic.  Eyes: Pupils are equal round reactive to light.  Left conjunctiva injected.  No ocular discharge.  Mild left upper eyelid swelling without erythema.  Nontender to palpation.  Extraocular motions intact bilaterally without reported pain.  No chemosis, hypopyon or hyphema.  Everted lids reveal no foreign body.  ENT: Oropharynx is clear.  Neck: The neck is supple.  No meningeal signs.  Respiratory: Respiratory effort was normal.  There is no stridor.  Air entry is equal.  Cardiovascular: Regular rate and rhythm.  Capillary refill is brisk.    Genitourinary: Not examined.  Lymphatic: No gross lymphadenopathy noted.  Musculoskeletal: Musculoskeletal system is grossly intact.  There is no obvious deformity.  Neurological: Gross motor movement is intact in all 4 extremities.  Patient exhibits normal  speech.  Skin: Skin is normal to inspection, except as documented.  Warm and dry.  No obvious bruising.  No obvious rash.            ED Course   Labs Reviewed - No data to display    MDM     Differential diagnosis including but not limited to conjunctivitis, keratitis, chalazion, hordeolum, preseptal cellulitis, orbital cellulitis    Physical exam remained stable as previously documented.  Physical exam findings discussed with patient.    I have given the patient instructions regarding their diagnoses, expectations, follow up, and ER precautions. I explained to the patient that emergent conditions may arise and to go to the ER for new, worsening or any persistent conditions. I've explained the importance of following up with their doctor as instructed. The patient verbalized understanding of the discharge instructions and plan.      Disposition and Plan     Clinical Impression:  1. Redness of left eye        Disposition:  Discharge    Follow-up:  Geoff Palacios MD  [address] [facility] [address]  [address]  [facility] [place] [facility] [address]  [phone]    Call in 1 day  For follow-up      Medications Prescribed:  Current Discharge Medication List        START taking these medications    Details   erythromycin 5 MG/GM Ophthalmic Ointment Apply 1 Application to eye every 4 (four) hours while awake for 10 days.  Qty: 1 g, Refills: 0

## 2025-02-08 NOTE — ED INITIAL ASSESSMENT (HPI)
Patient arrived ambulatory to room. +left eye redness x1 month. Patient states it started out with what looked like a \"pimple\" on the eyeball. Redness worsened over the last week. +tenderness to the orbit. No vision changes. No drainage. No fevers.

## 2025-04-02 DIAGNOSIS — B00.1 HERPES LABIALIS: ICD-10-CM

## 2025-04-02 RX ORDER — VALACYCLOVIR HYDROCHLORIDE 1 G/1
2000 TABLET, FILM COATED ORAL EVERY 12 HOURS SCHEDULED
Qty: 8 TABLET | Refills: 5 | Status: CANCELLED | OUTPATIENT
Start: 2025-04-02

## 2025-04-02 NOTE — TELEPHONE ENCOUNTER
New prescription sent 1/10/25, with refills.  Outpatient Medication Detail   Disp Refills Start End    valACYclovir 1 G Oral Tab 8 tablet 5 1/10/2025 --    Sig - Route: Take 2 tablets (2,000 mg total) by mouth every 12 (twelve) hours. Take for 1 day.  Take when needed for outbreak. - Oral    Patient not taking: Reported on 2/8/2025    Sent to pharmacy as: valACYclovir HCl 1 GM Oral Tablet (Valtrex)    E-Prescribing Status: Receipt confirmed by pharmacy (1/10/2025 10:17 AM CST)    Associated Diagnoses  Herpes labialis      Pharmacy  [id number] - [place], [place] - [address] AT [facility], [phone], [phone]

## (undated) DIAGNOSIS — B00.1 HERPES LABIALIS: ICD-10-CM

## (undated) DEVICE — Device: Brand: DEFENDO AIR/WATER/SUCTION AND BIOPSY VALVE

## (undated) DEVICE — CAPSULE ENDO PILL CAM SB3

## (undated) DEVICE — FORCEP RADIAL JAW 4

## (undated) DEVICE — ENDOSCOPY PACK UPPER: Brand: MEDLINE INDUSTRIES, INC.

## (undated) DEVICE — ENDOSCOPY PACK - LOWER: Brand: MEDLINE INDUSTRIES, INC.

## (undated) NOTE — MR AVS SNAPSHOT
[address]  [address] [facility]  [phone]               Thank you for choosing us for your health care visit with Micheal Espinoza DO.   We are glad to serve you and happy to provide you with this summary [address] [facility], [address] [place] [address]     Phone:  [phone]    - Azelastine HCl 0.1 % Soln  - predniSONE 20 MG Tabs            [facility] [employer]     Sign up for [employer], your secure online medical record.   [employer] will allow you to access

## (undated) NOTE — MR AVS SNAPSHOT
[address]  [address], [address]  [address] [facility]  [phone]               Thank you for choosing us for your health care visit with Rosanne Vela. DO [place].   We are glad to serve you and happy to provide you with this singh [employer] will allow you to access patient instructions from your recent visit,  view other health information, and more. To sign up or find more information, go to [email or web address] and click on the Sign Up Now link in the [employer] box.      Enter 2 ½ hours per week – spread out over time Use a liz to keep you motivated   Don’t forget strength training with weights and resistance Set goals and track your progress   You don’t need to join a gym. Home exercises work great.  Put more priority on exe

## (undated) NOTE — LETTER
5/16/2018              Read Blank        [address] [facility] [address]         To whom it may concern,     I been requested to write a letter for the patient.   The patient is undergoing evaluation of intussusception noted on pr

## (undated) NOTE — MR AVS SNAPSHOT
[facility]  [address] [place] [address], [address] [place] [address]               Thank you for choosing us for your health care visit with Frank Porter. DO S[place].   We are glad to serve you and happy to provide you with this summary your Zip Code and Date of Birth to complete the sign-up process. If you do not sign up before the expiration date, you must request a new code.     Your unique [employer] Access Code: [email or web address]  Expires: 7/2/2017 10:17 AM    If you have questions, you can ca

## (undated) NOTE — LETTER
AUTHORIZATION FOR SURGICAL OPERATION OR OTHER PROCEDURE    1.  I hereby authorize Dr. Lennette Bosworth, and [address] [place] [address] staff assigned to my case to perform the following operation and/or procedure at the [address] [place] [address]:    ______________right joe Time:  ________ A. M.  P.M.        Patient Name:  ______________________________________________________  (please print)      Patient signature:  ___________________________________________________             Relationship to Patient:

## (undated) NOTE — LETTER
AUTHORIZATION FOR SURGICAL OPERATION OR OTHER PROCEDURE    1.  I hereby authorize Dr. Hossein Spears, and [facility] staff assigned to my case to perform the following operation and/or procedure at the [facility]:    ________________________ Time:  ________ A. M.  P.M.        Patient Name:  ______________________________________________________  (please print)      Patient signature:  ___________________________________________________             Relationship to Patient:

## (undated) NOTE — LETTER
5/16/2018              CHEYANNE Rose         To Whom It May Concern,     I have been requested to write a letter for the patient.   Mr. Terra Baumgarten is undergoing evaluation of intussusception noted